# Patient Record
Sex: FEMALE | Race: WHITE | Employment: PART TIME | URBAN - METROPOLITAN AREA
[De-identification: names, ages, dates, MRNs, and addresses within clinical notes are randomized per-mention and may not be internally consistent; named-entity substitution may affect disease eponyms.]

---

## 2018-07-16 ENCOUNTER — APPOINTMENT (OUTPATIENT)
Dept: CT IMAGING | Age: 63
DRG: 312 | End: 2018-07-16
Attending: EMERGENCY MEDICINE
Payer: COMMERCIAL

## 2018-07-16 ENCOUNTER — HOSPITAL ENCOUNTER (INPATIENT)
Age: 63
LOS: 1 days | Discharge: HOME | DRG: 312 | End: 2018-07-17
Attending: EMERGENCY MEDICINE | Admitting: INTERNAL MEDICINE
Payer: COMMERCIAL

## 2018-07-16 DIAGNOSIS — R55 SYNCOPE AND COLLAPSE: Primary | ICD-10-CM

## 2018-07-16 DIAGNOSIS — S09.90XA INJURY OF HEAD, INITIAL ENCOUNTER: ICD-10-CM

## 2018-07-16 LAB
ALBUMIN SERPL-MCNC: 4.2 G/DL (ref 3.5–5)
ALBUMIN/GLOB SERPL: 1.3 {RATIO} (ref 1.1–2.2)
ALP SERPL-CCNC: 81 U/L (ref 45–117)
ALT SERPL-CCNC: 21 U/L (ref 12–78)
ANION GAP SERPL CALC-SCNC: 8 MMOL/L (ref 5–15)
AST SERPL-CCNC: 29 U/L (ref 15–37)
ATRIAL RATE: 80 BPM
BASOPHILS # BLD: 0.1 K/UL (ref 0–0.1)
BASOPHILS NFR BLD: 1 % (ref 0–1)
BILIRUB SERPL-MCNC: 0.9 MG/DL (ref 0.2–1)
BUN SERPL-MCNC: 13 MG/DL (ref 6–20)
BUN/CREAT SERPL: 10 (ref 12–20)
CALCIUM SERPL-MCNC: 9.2 MG/DL (ref 8.5–10.1)
CALCULATED P AXIS, ECG09: 67 DEGREES
CALCULATED R AXIS, ECG10: -15 DEGREES
CALCULATED T AXIS, ECG11: 21 DEGREES
CHLORIDE SERPL-SCNC: 104 MMOL/L (ref 97–108)
CK MB CFR SERPL CALC: 1.5 % (ref 0–2.5)
CK MB SERPL-MCNC: 3.4 NG/ML (ref 5–25)
CK SERPL-CCNC: 220 U/L (ref 26–192)
CO2 SERPL-SCNC: 29 MMOL/L (ref 21–32)
CREAT SERPL-MCNC: 1.27 MG/DL (ref 0.55–1.02)
DIAGNOSIS, 93000: NORMAL
DIFFERENTIAL METHOD BLD: ABNORMAL
EOSINOPHIL # BLD: 0.9 K/UL (ref 0–0.4)
EOSINOPHIL NFR BLD: 9 % (ref 0–7)
ERYTHROCYTE [DISTWIDTH] IN BLOOD BY AUTOMATED COUNT: 12.7 % (ref 11.5–14.5)
GLOBULIN SER CALC-MCNC: 3.3 G/DL (ref 2–4)
GLUCOSE SERPL-MCNC: 94 MG/DL (ref 65–100)
HCT VFR BLD AUTO: 48.6 % (ref 35–47)
HGB BLD-MCNC: 15.7 G/DL (ref 11.5–16)
IMM GRANULOCYTES # BLD: 0.1 K/UL (ref 0–0.04)
IMM GRANULOCYTES NFR BLD AUTO: 1 % (ref 0–0.5)
LYMPHOCYTES # BLD: 1.3 K/UL (ref 0.8–3.5)
LYMPHOCYTES NFR BLD: 13 % (ref 12–49)
MCH RBC QN AUTO: 31.8 PG (ref 26–34)
MCHC RBC AUTO-ENTMCNC: 32.3 G/DL (ref 30–36.5)
MCV RBC AUTO: 98.4 FL (ref 80–99)
MONOCYTES # BLD: 0.9 K/UL (ref 0–1)
MONOCYTES NFR BLD: 9 % (ref 5–13)
NEUTS SEG # BLD: 6.6 K/UL (ref 1.8–8)
NEUTS SEG NFR BLD: 68 % (ref 32–75)
NRBC # BLD: 0 K/UL (ref 0–0.01)
NRBC BLD-RTO: 0 PER 100 WBC
P-R INTERVAL, ECG05: 136 MS
PLATELET # BLD AUTO: 273 K/UL (ref 150–400)
PMV BLD AUTO: 9.8 FL (ref 8.9–12.9)
POTASSIUM SERPL-SCNC: 3.4 MMOL/L (ref 3.5–5.1)
PROT SERPL-MCNC: 7.5 G/DL (ref 6.4–8.2)
Q-T INTERVAL, ECG07: 384 MS
QRS DURATION, ECG06: 76 MS
QTC CALCULATION (BEZET), ECG08: 442 MS
RBC # BLD AUTO: 4.94 M/UL (ref 3.8–5.2)
SODIUM SERPL-SCNC: 141 MMOL/L (ref 136–145)
TROPONIN I SERPL-MCNC: <0.05 NG/ML
TROPONIN I SERPL-MCNC: <0.05 NG/ML
VENTRICULAR RATE, ECG03: 80 BPM
WBC # BLD AUTO: 9.8 K/UL (ref 3.6–11)

## 2018-07-16 PROCEDURE — 99285 EMERGENCY DEPT VISIT HI MDM: CPT

## 2018-07-16 PROCEDURE — 70450 CT HEAD/BRAIN W/O DYE: CPT

## 2018-07-16 PROCEDURE — 65660000000 HC RM CCU STEPDOWN

## 2018-07-16 PROCEDURE — 80053 COMPREHEN METABOLIC PANEL: CPT | Performed by: EMERGENCY MEDICINE

## 2018-07-16 PROCEDURE — 70486 CT MAXILLOFACIAL W/O DYE: CPT

## 2018-07-16 PROCEDURE — 36415 COLL VENOUS BLD VENIPUNCTURE: CPT | Performed by: INTERNAL MEDICINE

## 2018-07-16 PROCEDURE — 84484 ASSAY OF TROPONIN QUANT: CPT | Performed by: EMERGENCY MEDICINE

## 2018-07-16 PROCEDURE — 85025 COMPLETE CBC W/AUTO DIFF WBC: CPT | Performed by: EMERGENCY MEDICINE

## 2018-07-16 PROCEDURE — 74011250637 HC RX REV CODE- 250/637: Performed by: INTERNAL MEDICINE

## 2018-07-16 PROCEDURE — 74011250636 HC RX REV CODE- 250/636: Performed by: INTERNAL MEDICINE

## 2018-07-16 PROCEDURE — 82550 ASSAY OF CK (CPK): CPT | Performed by: EMERGENCY MEDICINE

## 2018-07-16 PROCEDURE — 93005 ELECTROCARDIOGRAM TRACING: CPT

## 2018-07-16 PROCEDURE — 82553 CREATINE MB FRACTION: CPT | Performed by: EMERGENCY MEDICINE

## 2018-07-16 RX ORDER — SODIUM CHLORIDE 9 MG/ML
75 INJECTION, SOLUTION INTRAVENOUS CONTINUOUS
Status: DISCONTINUED | OUTPATIENT
Start: 2018-07-16 | End: 2018-07-17 | Stop reason: HOSPADM

## 2018-07-16 RX ORDER — TRAMADOL HYDROCHLORIDE 50 MG/1
50 TABLET ORAL
COMMUNITY
End: 2018-07-31 | Stop reason: ALTCHOICE

## 2018-07-16 RX ORDER — GUAIFENESIN 100 MG/5ML
81 LIQUID (ML) ORAL DAILY
Status: DISCONTINUED | OUTPATIENT
Start: 2018-07-17 | End: 2018-07-17 | Stop reason: HOSPADM

## 2018-07-16 RX ORDER — FAMOTIDINE 20 MG/1
20 TABLET, FILM COATED ORAL
Status: DISCONTINUED | OUTPATIENT
Start: 2018-07-16 | End: 2018-07-17 | Stop reason: HOSPADM

## 2018-07-16 RX ORDER — ACETAMINOPHEN 325 MG/1
650 TABLET ORAL
Status: DISCONTINUED | OUTPATIENT
Start: 2018-07-16 | End: 2018-07-17 | Stop reason: HOSPADM

## 2018-07-16 RX ORDER — SODIUM CHLORIDE 0.9 % (FLUSH) 0.9 %
5-10 SYRINGE (ML) INJECTION EVERY 8 HOURS
Status: DISCONTINUED | OUTPATIENT
Start: 2018-07-16 | End: 2018-07-17 | Stop reason: HOSPADM

## 2018-07-16 RX ORDER — ACETAMINOPHEN 325 MG/1
325 TABLET ORAL
COMMUNITY

## 2018-07-16 RX ORDER — ALBUTEROL SULFATE 0.83 MG/ML
SOLUTION RESPIRATORY (INHALATION) ONCE
COMMUNITY
End: 2018-07-16

## 2018-07-16 RX ORDER — SODIUM CHLORIDE 0.9 % (FLUSH) 0.9 %
5-10 SYRINGE (ML) INJECTION AS NEEDED
Status: DISCONTINUED | OUTPATIENT
Start: 2018-07-16 | End: 2018-07-17 | Stop reason: HOSPADM

## 2018-07-16 RX ORDER — TRAZODONE HYDROCHLORIDE 50 MG/1
50 TABLET ORAL
Status: DISCONTINUED | OUTPATIENT
Start: 2018-07-16 | End: 2018-07-17 | Stop reason: HOSPADM

## 2018-07-16 RX ORDER — ALBUTEROL SULFATE 90 UG/1
2 AEROSOL, METERED RESPIRATORY (INHALATION)
COMMUNITY

## 2018-07-16 RX ORDER — MONTELUKAST SODIUM 10 MG/1
10 TABLET ORAL
Status: DISCONTINUED | OUTPATIENT
Start: 2018-07-16 | End: 2018-07-17 | Stop reason: HOSPADM

## 2018-07-16 RX ORDER — ALBUTEROL SULFATE 90 UG/1
2 AEROSOL, METERED RESPIRATORY (INHALATION)
Status: DISCONTINUED | OUTPATIENT
Start: 2018-07-16 | End: 2018-07-17 | Stop reason: HOSPADM

## 2018-07-16 RX ORDER — ENOXAPARIN SODIUM 100 MG/ML
40 INJECTION SUBCUTANEOUS EVERY 24 HOURS
Status: DISCONTINUED | OUTPATIENT
Start: 2018-07-16 | End: 2018-07-17 | Stop reason: HOSPADM

## 2018-07-16 RX ORDER — ACETAMINOPHEN 325 MG/1
325 TABLET ORAL
Status: COMPLETED | OUTPATIENT
Start: 2018-07-16 | End: 2018-07-16

## 2018-07-16 RX ADMIN — ENOXAPARIN SODIUM 40 MG: 40 INJECTION SUBCUTANEOUS at 18:36

## 2018-07-16 RX ADMIN — ACETAMINOPHEN 325 MG: 325 TABLET ORAL at 18:36

## 2018-07-16 RX ADMIN — ALBUTEROL SULFATE 2 PUFF: 90 AEROSOL, METERED RESPIRATORY (INHALATION) at 23:31

## 2018-07-16 RX ADMIN — SODIUM CHLORIDE 75 ML/HR: 900 INJECTION, SOLUTION INTRAVENOUS at 18:36

## 2018-07-16 RX ADMIN — Medication 10 ML: at 20:36

## 2018-07-16 RX ADMIN — BACITRACIN ZINC, NEOMYCIN SULFATE, POLYMYXIN B SULFATE 1 PACKET: 3.5; 5000; 4 OINTMENT TOPICAL at 18:36

## 2018-07-16 NOTE — PROGRESS NOTES
Pharmacy Clarification of Prior to Admission Medication Regimen     The patient was interviewed regarding clarification of the prior to admission medication regimen. Patient's  was present in room and obtained permission from patient to discuss drug regimen with visitor(s) present. Patient was questioned regarding use of any other inhalers, topical products, over the counter medications, herbal medications, vitamin products or ophthalmic/nasal/otic medication use. Information Obtained From: Rx Query and patient    Pertinent Pharmacy Findings:   Updated patients preferred outpatient pharmacy to: [facility] [address] [address]   montelukast (SINGULAIR) 10 mg tablet: Patient stated she has this agent at home and takes it \"as needed. \"    PTA medication list was corrected to the following:     Prior to Admission Medications   Prescriptions Last Dose Informant Patient Reported? Taking?   acetaminophen (TYLENOL) 325 mg tablet 7/15/2018 at Unknown time Self Yes Yes   Sig: Take 325 mg by mouth every four (4) hours as needed for Pain. albuterol (PROAIR HFA) 90 mcg/actuation inhaler 7/15/2018 at Unknown time Self Yes Yes   Sig: Take 2 Puffs by inhalation every six (6) hours as needed for Wheezing. doxylamine succinate (SLEEP AID, DOXYLAMINE,) 25 mg tablet 7/14/2018 at Unknown time Self Yes Yes   Sig: Take 50 mg by mouth nightly as needed for Sleep.   famotidine (PEPCID PO) 7/14/2018 at Unknown time Self Yes Yes   Sig: Take 1 Tab by mouth daily as needed (\"GERD\"). montelukast (SINGULAIR) 10 mg tablet Not Taking at Unknown time Self Yes No   Sig: Take 10 mg by mouth daily as needed (\"Allergies\"). traMADol (ULTRAM) 50 mg tablet 7/15/2018 at Unknown time Self Yes Yes   Sig: Take 50 mg by mouth every six (6) hours as needed for Pain.       Facility-Administered Medications: None          Thank you,  Reginald Chambers CPhT  Medication History Pharmacy Technician

## 2018-07-16 NOTE — IP AVS SNAPSHOT
[address] [facility] 
[phone] Patient: Leanne Dai MRN: [id number] :1955 A check mavis indicates which time of day the medication should be taken. My Medications CONTINUE taking these medications Instructions Each Dose to Equal  
 Morning Noon Evening Bedtime PEPCID PO Your last dose was: Your next dose is: Take 1 Tab by mouth daily as needed (\"GERD\"). 1 Tab PROAIR HFA 90 mcg/actuation inhaler Generic drug:  albuterol Your last dose was: Your next dose is: Take 2 Puffs by inhalation every six (6) hours as needed for Wheezing. 2 Puff SINGULAIR 10 mg tablet Generic drug:  montelukast  
   
Your last dose was: Your next dose is: Take 10 mg by mouth daily as needed (\"Allergies\"). 10 mg Sleep Aid (Doxylamine) 25 mg tablet Generic drug:  doxylamine succinate Your last dose was: Your next dose is: Take 50 mg by mouth nightly as needed for Sleep. 50 mg  
    
   
   
   
  
 traMADol 50 mg tablet Commonly known as:  ULTRAM  
   
Your last dose was: Your next dose is: Take 50 mg by mouth every six (6) hours as needed for Pain. 50 mg  
    
   
   
   
  
 TYLENOL 325 mg tablet Generic drug:  acetaminophen Your last dose was: Your next dose is: Take 325 mg by mouth every four (4) hours as needed for Pain.   
 325 mg

## 2018-07-16 NOTE — ED NOTES
TRANSFER - OUT REPORT:    Verbal report given to Pearl Stanley RN(name) on Darvin Lau  being transferred to neuro room 3123(unit) for routine progression of care       Report consisted of patients Situation, Background, Assessment and   Recommendations(SBAR). Information from the following report(s) SBAR, Kardex, ED Summary, Procedure Summary, Intake/Output, MAR and Recent Results was reviewed with the receiving nurse. Lines:   Peripheral IV 07/16/18 Right Antecubital (Active)   Site Assessment Clean, dry, & intact 7/16/2018  6:16 PM   Phlebitis Assessment 0 7/16/2018  6:16 PM   Infiltration Assessment 0 7/16/2018  6:16 PM   Dressing Status Clean, dry, & intact 7/16/2018  6:16 PM   Dressing Type Transparent 7/16/2018  6:16 PM   Hub Color/Line Status Pink;Capped;Flushed;Patent 7/16/2018  6:16 PM        Opportunity for questions and clarification was provided.       Patient transported with:   [employer]

## 2018-07-16 NOTE — CONSULTS
[address] [place] [address] [facility]     Date of  Admission: 7/16/2018 12:46 PM     Admission type:Emergency    Consult for: syncope  Consult by:ED physician      Subjective:     Madeline Garces is a 57 y.o. female with PMH asthma who presented to the ED after a syncopal episode last night. Per ED provider note and patient report, Ms. Lawyer Tate spent yesterday at the pool, and when she got home she was walking through the house when she passed out. Her  was present. The syncopal episode did not have preceding symptoms. Ms. Lawyer Tate felt normal when she awoke on the floor, and her  states that she was only \"out for a second\". She presented to the ED because of the swelling on her face from the fall, not because of any symptoms out of her ordinary. She denies chest pain, SOB, palpitations, dizziness, numbness/tingling, leg swelling,  No new medications, travel, or illnesses. She does not smoke and only drinks socially. ED provider note states she had two beers yesterday. Ms. Lawyer Tate does not follow with a cardiologist and has no prior cardiac testing.        Cardiac risk factors: post-menopausal.      Patient Active Problem List    Diagnosis Date Noted    Syncope 07/16/2018      Michael Zuniga MD  Past Medical History:   Diagnosis Date    Asthma     Skin cancer     Sun-damaged skin     Tanning bed exposure       Social History     Social History    Marital status:      Spouse name: N/A    Number of children: N/A    Years of education: N/A     Social History Main Topics    Smoking status: Never Smoker    Smokeless tobacco: Never Used    Alcohol use Yes      Comment: socially    Drug use: No    Sexual activity: Yes     Birth control/ protection: Surgical     Other Topics Concern    None     Social History Narrative     Allergies   Allergen Reactions    Augmentin [Amoxicillin-Pot Clavulanate] Hives      History reviewed. No pertinent family history. No current facility-administered medications for this encounter. Current Outpatient Prescriptions   Medication Sig    albuterol (PROAIR HFA) 90 mcg/actuation inhaler Take 2 Puffs by inhalation every six (6) hours as needed for Wheezing.  doxylamine succinate (SLEEP AID, DOXYLAMINE,) 25 mg tablet Take 50 mg by mouth nightly as needed for Sleep.  traMADol (ULTRAM) 50 mg tablet Take 50 mg by mouth every six (6) hours as needed for Pain.  famotidine (PEPCID PO) Take 1 Tab by mouth daily as needed (\"GERD\").  acetaminophen (TYLENOL) 325 mg tablet Take 325 mg by mouth every four (4) hours as needed for Pain.  montelukast (SINGULAIR) 10 mg tablet Take 10 mg by mouth daily as needed (\"Allergies\"). Review of Symptoms:   11 systems reviewed, negative other than as stated in the HPI        Objective:      Visit Vitals    BP (!) 112/92    Pulse 83    Temp 98.1 °F (36.7 °C)    Resp 14    Ht 5' 5\" (1.651 m)    Wt 60.6 kg (133 lb 9.6 oz)    SpO2 93%    BMI 22.23 kg/m2       Physical:   General: pleasant  female resting on stretcher in NAD. R periorbital swelling and ecchymosis.    Heart: RRR, no m/S3/JVD, no carotid bruits   Lungs: clear   Abdomen: Soft, +BS, NTND   Extremities: LE felisa +DP/PT, no edema   Neurologic: Grossly normal    Data Review:   Recent Labs      07/16/18   1236   WBC  9.8   HGB  15.7   HCT  48.6*   PLT  273     Recent Labs      07/16/18   1236   NA  141   K  3.4*   CL  104   CO2  29   GLU  94   BUN  13   CREA  1.27*   CA  9.2   ALB  4.2   TBILI  0.9   SGOT  29   ALT  21       Recent Labs      07/16/18   1236   TROIQ  <0.05   CKMB  3.4       No intake or output data in the 24 hours ending 07/16/18 1609     Cardiographics    Telemetry: NSR  ECG: NSR  Echocardiogram: ordered  CT head: no acute process  CT maxillofacial: no fracture        Assessment:       Active Problems:    Syncope (7/16/2018)         Plan: Kierra Byrd is a 57 y.o. female who presented to the hospital today after a syncopal episode last night. She abruptly passed out while walking in her house. She has never had syncope before. She denies symptoms other than swelling to her face. K 3.4; creat 1.27; ;  Troponin negative. · Unclear cause of syncope. · Orthostatics ordered in ED  · Obtain ECHO and stress test  · Continuous cardiac monitoring  · If no identifiable cause of syncope, can consider out-patient event monitor to further assess for arrhythmias. Thank you for consulting [facility]    Jeaneth Mcintyre NP  DNP, RN, AGACNP-BC      Pt seen and examined in details. Agree with NP A&P. D/w pt and .      Kari Avila MD

## 2018-07-16 NOTE — ED PROVIDER NOTES
EMERGENCY DEPARTMENT HISTORY AND PHYSICAL EXAM 
 
 
Date: 7/16/2018 Patient Name: Mikie Blizzard History of Presenting Illness Chief Complaint Patient presents with  Syncope Last night after drinking a few beers and at the pool injurying face History Provided By: Patient and Patient's  HPI: Mikie Blizzard, 57 y.o. female with PMHx significant for asthma, presents ambulatory to the ED with cc of syncopal episode yesterday evening. She reports facial pain and swelling because of the GLF due to syncopal episode. Pt states she was at the pool yesterday from 11:30 AM to 4 PM during which time she admits to not eating or drinking much. She reports that after leaving the pool, she stopped to get a sandwich and had 2 beers while waiting. Pt states she ate part of the sandwich during her 30 minute drive home. She reports that she returned home, walked through the door, and passed out. Per pt's , after the episode pt seemed like \"she was coughing like she needed her inhaler. \" He states pt was not shaking and that she came out of it quickly. Pt states she applied ice to her face and head after the fall with no relief of swelling. She reports that she called her PCP today who recommended she come to the ED. Pt states she has never passed out before. She denies any sxs prior to her syncopal episode. Pt denies any recent illnesses. She denies any new medication. Pt denies any associated blood in her stool or any urinary sxs. She denies PMHx blood clots, stroke, aneurysm, or seizure. Pt notes FMHx of her mother passing out when she vomits. There are no other complaints, changes, or physical findings at this time. PCP: Deepthi Mcwilliams MD 
 
Current Facility-Administered Medications Medication Dose Route Frequency Provider Last Rate Last Dose  sodium chloride (NS) flush 5-10 mL  5-10 mL IntraVENous Q8H Sina Estrada MD      
 sodium chloride (NS) flush 5-10 mL  5-10 mL IntraVENous PRN Susan Abdul MD      
 0.9% sodium chloride infusion  75 mL/hr IntraVENous CONTINUOUS Susan Abdul MD      
 enoxaparin (LOVENOX) injection 40 mg  40 mg SubCUTAneous Q24H Susan Abdul MD      
 albuterol (PROVENTIL HFA, VENTOLIN HFA, PROAIR HFA) inhaler 2 Puff  2 Puff Inhalation Q4H PRN Susan Abdul MD      
 famotidine (PEPCID) tablet 20 mg  20 mg Oral DAILY PRN Susan Abdul MD      
 montelukast (SINGULAIR) tablet 10 mg  10 mg Oral DAILY PRN Susan Abdul MD      
 
Current Outpatient Prescriptions Medication Sig Dispense Refill  albuterol (PROAIR HFA) 90 mcg/actuation inhaler Take 2 Puffs by inhalation every six (6) hours as needed for Wheezing.  doxylamine succinate (SLEEP AID, DOXYLAMINE,) 25 mg tablet Take 50 mg by mouth nightly as needed for Sleep.  traMADol (ULTRAM) 50 mg tablet Take 50 mg by mouth every six (6) hours as needed for Pain.  famotidine (PEPCID PO) Take 1 Tab by mouth daily as needed (\"GERD\").  acetaminophen (TYLENOL) 325 mg tablet Take 325 mg by mouth every four (4) hours as needed for Pain.  montelukast (SINGULAIR) 10 mg tablet Take 10 mg by mouth daily as needed (\"Allergies\"). Past History Past Medical History: 
Past Medical History:  
Diagnosis Date  Asthma   
 Skin cancer  Sun-damaged skin  Tanning bed exposure Past Surgical History: 
Past Surgical History:  
Procedure Laterality Date  HX HYSTERECTOMY Family History: 
History reviewed. No pertinent family history. Social History: 
Social History Substance Use Topics  Smoking status: Never Smoker  Smokeless tobacco: Never Used  Alcohol use Yes Comment: socially Allergies: Allergies Allergen Reactions  Augmentin [Amoxicillin-Pot Clavulanate] Hives Review of Systems Review of Systems Constitutional: Negative. Negative for chills and fever. HENT: Positive for facial swelling (with pain).  Negative for congestion and rhinorrhea. Respiratory: Negative. Negative for cough, chest tightness and wheezing. Cardiovascular: Negative. Negative for chest pain and palpitations. Gastrointestinal: Negative. Negative for abdominal pain, blood in stool, constipation, nausea and vomiting. Endocrine: Negative. Genitourinary: Negative. Negative for decreased urine volume, difficulty urinating, dysuria, flank pain, hematuria and pelvic pain. Musculoskeletal: Negative. Negative for back pain and neck pain. Skin: Negative. Negative for color change, pallor and rash. Neurological: Positive for syncope. Negative for dizziness, seizures, weakness, numbness and headaches. Hematological: Negative. Negative for adenopathy. Psychiatric/Behavioral: Negative. All other systems reviewed and are negative. Physical Exam  
Physical Exam  
Constitutional: She is oriented to person, place, and time. She appears well-developed and well-nourished. No distress. HENT:  
Head: Normocephalic. Head is with contusion. Head is without raccoon's eyes and without Anguiano's sign. Right Ear: No hemotympanum. Left Ear: No hemotympanum. Nose: No nasal septal hematoma. No epistaxis. Mouth/Throat: No oropharyngeal exudate. Bruising right face, no bone step off, EOMI, no entrapment Eyes: Conjunctivae are normal. Pupils are equal, round, and reactive to light. Right eye exhibits no discharge. Left eye exhibits no discharge. No scleral icterus. Neck: Normal range of motion. Neck supple. No JVD present. No rigidity. Normal range of motion present. Cardiovascular: Normal rate, regular rhythm, normal heart sounds, intact distal pulses and normal pulses. PMI is not displaced. Exam reveals no gallop and no friction rub. No murmur heard. Pulmonary/Chest: Effort normal and breath sounds normal. No stridor. No respiratory distress. She has no wheezes. She has no rales. She exhibits no tenderness.   
Abdominal: Soft. Bowel sounds are normal. She exhibits no distension and no mass. There is no tenderness. There is no rebound and no guarding. Neurological: She is alert and oriented to person, place, and time. She displays normal reflexes. No cranial nerve deficit. She exhibits normal muscle tone. Coordination normal.  
Skin: Skin is warm. No rash noted. She is not diaphoretic. No pallor. Vitals reviewed. Diagnostic Study Results Labs - Recent Results (from the past 12 hour(s)) EKG, 12 LEAD, INITIAL Collection Time: 07/16/18 12:24 PM  
Result Value Ref Range Ventricular Rate 80 BPM  
 Atrial Rate 80 BPM  
 P-R Interval 136 ms QRS Duration 76 ms  
 Q-T Interval 384 ms QTC Calculation (Bezet) 442 ms Calculated P Axis 67 degrees Calculated R Axis -15 degrees Calculated T Axis 21 degrees Diagnosis Normal sinus rhythm Nonspecific ST abnormality No previous ECGs available METABOLIC PANEL, COMPREHENSIVE Collection Time: 07/16/18 12:36 PM  
Result Value Ref Range Sodium 141 136 - 145 mmol/L Potassium 3.4 (L) 3.5 - 5.1 mmol/L Chloride 104 97 - 108 mmol/L  
 CO2 29 21 - 32 mmol/L Anion gap 8 5 - 15 mmol/L Glucose 94 65 - 100 mg/dL BUN 13 6 - 20 MG/DL Creatinine 1.27 (H) 0.55 - 1.02 MG/DL  
 BUN/Creatinine ratio 10 (L) 12 - 20 GFR est AA 51 (L) >60 ml/min/1.73m2 GFR est non-AA 42 (L) >60 ml/min/1.73m2 Calcium 9.2 8.5 - 10.1 MG/DL Bilirubin, total 0.9 0.2 - 1.0 MG/DL  
 ALT (SGPT) 21 12 - 78 U/L  
 AST (SGOT) 29 15 - 37 U/L Alk. phosphatase 81 45 - 117 U/L Protein, total 7.5 6.4 - 8.2 g/dL Albumin 4.2 3.5 - 5.0 g/dL Globulin 3.3 2.0 - 4.0 g/dL A-G Ratio 1.3 1.1 - 2.2    
CBC WITH AUTOMATED DIFF Collection Time: 07/16/18 12:36 PM  
Result Value Ref Range WBC 9.8 3.6 - 11.0 K/uL  
 RBC 4.94 3.80 - 5.20 M/uL  
 HGB 15.7 11.5 - 16.0 g/dL HCT 48.6 (H) 35.0 - 47.0 %  MCV 98.4 80.0 - 99.0 FL  
 MCH 31.8 26.0 - 34.0 PG  
 MCHC 32.3 30.0 - 36.5 g/dL  
 RDW 12.7 11.5 - 14.5 % PLATELET 150 281 - 958 K/uL MPV 9.8 8.9 - 12.9 FL  
 NRBC 0.0 0  WBC ABSOLUTE NRBC 0.00 0.00 - 0.01 K/uL NEUTROPHILS 68 32 - 75 % LYMPHOCYTES 13 12 - 49 % MONOCYTES 9 5 - 13 % EOSINOPHILS 9 (H) 0 - 7 % BASOPHILS 1 0 - 1 % IMMATURE GRANULOCYTES 1 (H) 0.0 - 0.5 % ABS. NEUTROPHILS 6.6 1.8 - 8.0 K/UL  
 ABS. LYMPHOCYTES 1.3 0.8 - 3.5 K/UL  
 ABS. MONOCYTES 0.9 0.0 - 1.0 K/UL  
 ABS. EOSINOPHILS 0.9 (H) 0.0 - 0.4 K/UL  
 ABS. BASOPHILS 0.1 0.0 - 0.1 K/UL  
 ABS. IMM. GRANS. 0.1 (H) 0.00 - 0.04 K/UL  
 DF AUTOMATED    
TROPONIN I Collection Time: 07/16/18 12:36 PM  
Result Value Ref Range Troponin-I, Qt. <0.05 <0.05 ng/mL CK W/ REFLX CKMB Collection Time: 07/16/18 12:36 PM  
Result Value Ref Range  (H) 26 - 192 U/L  
CK-MB,QUANT. Collection Time: 07/16/18 12:36 PM  
Result Value Ref Range CK - MB 3.4 <3.6 NG/ML  
 CK-MB Index 1.5 0 - 2.5 Radiologic Studies -  
CT Results  (Last 48 hours) 07/16/18 1427  CT HEAD WO CONT Final result Impression:  IMPRESSION: No acute intracranial abnormality. Narrative:  EXAM:  CT HEAD WITHOUT CONTRAST INDICATION: Fall. COMPARISON: None. CONTRAST: None. TECHNIQUE: Unenhanced CT of the head was performed using 5 mm images. Brain and  
bone windows were generated. Sagittal and coronal reformations were generated. CT dose reduction was achieved through use of a standardized protocol tailored  
for this examination and automatic exposure control for dose modulation. FINDINGS:  
The ventricles and sulci are normal in size, shape and configuration and  
midline. There is no significant white matter disease. There is no  
intracranial hemorrhage. There is no extra-axial collection, mass, mass effect  
or midline shift. The basilar cisterns are open. No acute infarct is  
identified. The bone windows demonstrate no abnormalities. There is  
mucoperiosteal thickening in the paranasal sinuses. 07/16/18 1427  CT MAXILLOFACIAL WO CONT Final result Impression:  IMPRESSION: No facial fracture. Narrative:  EXAM:  CT MAXILLOFACIAL WO CONT INDICATION: Fall. COMPARISON:  None. CONTRAST: None. TECHNIQUE:  Multislice helical CT of the facial bones was performed in the axial  
plane without intravenous contrast administration. Coronal and sagittal  
reformations were generated. CT dose reduction was achieved through use of a  
standardized protocol tailored for this examination and automatic exposure  
control for dose modulation. FINDINGS:  
There is no facial fracture or other osseous abnormality. There is mucoperiosteal thickening throughout the maxillary and ethmoid sinuses. The globes, optic nerves and extraocular muscles are normal.  
No abnormalities are identified within the visualized portions of the brain or  
nasopharynx. Medical Decision Making I am the first provider for this patient. I reviewed the vital signs, available nursing notes, past medical history, past surgical history, family history and social history. Vital Signs-Reviewed the patient's vital signs. Patient Vitals for the past 12 hrs: 
 Temp Pulse Resp BP SpO2  
07/16/18 1745 - 81 - (!) 136/92 93 % 07/16/18 1700 - 79 - (!) 128/91 90 % 07/16/18 1615 - 98 - (!) 134/91 92 % 07/16/18 1606 - 91 - (!) 122/91 92 % 07/16/18 1605 - 85 - (!) 134/96 93 % 07/16/18 1603 - 79 - 137/83 94 % 07/16/18 1530 - - - (!) 112/92 93 % 07/16/18 1518 - 83 14 119/83 94 % 07/16/18 1516 - - - 119/83 -  
07/16/18 1216 98.1 °F (36.7 °C) 94 18 118/87 95 % EKG interpretation: (Preliminary) 12:24 Rhythm: normal sinus rhythm; and regular . Rate (approx.): 80; Axis: normal; SD interval: normal; QRS interval: normal ; ST/T wave: nonspecific ST abnormality.  
Written by BRADEN Humphries, as dictated by Gabe Frias MD. Records Reviewed: Old Medical Records Provider Notes (Medical Decision Making): DDx: vasovagal syncope, arrhythmia, seizure, ICH, facial fracture, cervical injury, anemia, electrolyte abnormality Impression/plan: healthy pt experienced unprovoked syncopal event not preceded by sxs. Of concern, pt fell and hit her face without trying to protect herself. Work up unremarkable in ER but due to pts age, will admit for syncope evaluation. ED Course:  
Initial assessment performed. The patients presenting problems have been discussed, and they are in agreement with the care plan formulated and outlined with them. I have encouraged them to ask questions as they arise throughout their visit. Consult Note: 
3:25 PM 
Gabe Frias MD spoke with Vasiliy Gao MD 
Specialty: Cardiology Discussed pt's hx, disposition, and available diagnostic and imaging results. Dr. Audra Paniagua recommends admission for syncope work up. Consult Note: 
3:32 PM 
Gabe Frias MD spoke with Isa Awad MD 
Specialty: Hospitalist 
Discussed pt's hx, disposition, and available diagnostic and imaging results. Dr. Raúl Tucker will admit pt. Disposition: 
Admit Note: 
3:32 PM 
Pt is being admitted by Dr. Raúl Tucker. The results of their tests and reason(s) for their admission have been discussed with pt and/or available family. They convey agreement and understanding for the need to be admitted and for admission diagnosis. Diagnosis Clinical Impression: 1. Syncope and collapse 2. Injury of head, initial encounter Attestations: This note is prepared by Teresa Choi, acting as a Scribe for MD Gabe Ruth MD: The scribe's documentation has been prepared under my direction and personally reviewed by me in its entirety.  I confirm that the notes above accurately reflects all work, treatment, procedures, and medical decision making performed by me.

## 2018-07-16 NOTE — ED NOTES
Pt. Has bruising to left forehead and bruising (green/red/purple) to right side of forehead and around eye to bridge of nose.

## 2018-07-16 NOTE — IP AVS SNAPSHOT
[address] [facility] 
[phone] Patient: Ana Will MRN: [id number] :1955 About your hospitalization You were admitted on:  2018 You last received care in the:  [facility] 3 NEUROSCIENCE TELEMETRY You were discharged on:  2018 Why you were hospitalized Your primary diagnosis was:  Not on File Your diagnoses also included:  Syncope Follow-up Information Follow up With Details Comments Contact Info Kenny Garcai MD Go on 2018 Please follow up on 2018 at 9:00 [address] [place] [id number] 
[phone] Discharge Orders None A check mavis indicates which time of day the medication should be taken. My Medications CONTINUE taking these medications Instructions Each Dose to Equal  
 Morning Noon Evening Bedtime PEPCID PO Your last dose was: Your next dose is: Take 1 Tab by mouth daily as needed (\"GERD\"). 1 Tab PROAIR HFA 90 mcg/actuation inhaler Generic drug:  albuterol Your last dose was: Your next dose is: Take 2 Puffs by inhalation every six (6) hours as needed for Wheezing. 2 Puff SINGULAIR 10 mg tablet Generic drug:  montelukast  
   
Your last dose was: Your next dose is: Take 10 mg by mouth daily as needed (\"Allergies\"). 10 mg Sleep Aid (Doxylamine) 25 mg tablet Generic drug:  doxylamine succinate Your last dose was: Your next dose is: Take 50 mg by mouth nightly as needed for Sleep. 50 mg  
    
   
   
   
  
 traMADol 50 mg tablet Commonly known as:  ULTRAM  
   
Your last dose was: Your next dose is: Take 50 mg by mouth every six (6) hours as needed for Pain.   
 50 mg  
    
   
   
   
  
 TYLENOL 325 mg tablet Generic drug:  acetaminophen Your last dose was: Your next dose is: Take 325 mg by mouth every four (4) hours as needed for Pain. 325 mg Opioid Education Prescription Opioids: What You Need to Know: 
 
Prescription opioids can be used to help relieve moderate-to-severe pain and are often prescribed following a surgery or injury, or for certain health conditions. These medications can be an important part of treatment but also come with serious risks. Opioids are strong pain medicines. Examples include hydrocodone, oxycodone, fentanyl, and morphine. Heroin is an example of an illegal opioid. It is important to work with your health care provider to make sure you are getting the safest, most effective care. WHAT ARE THE RISKS AND SIDE EFFECTS OF OPIOID USE? Prescription opioids carry serious risks of addiction and overdose, especially with prolonged use. An opioid overdose, often marked by slow breathing, can cause sudden death. The use of prescription opioids can have a number of side effects as well, even when taken as directed. · Tolerance-meaning you might need to take more of a medication for the same pain relief · Physical dependence-meaning you have symptoms of withdrawal when the medication is stopped. Withdrawal symptoms can include nausea, sweating, chills, diarrhea, stomach cramps, and muscle aches. Withdrawal can last up to several weeks, depending on which drug you took and how long you took it. · Increased sensitivity to pain · Constipation · Nausea, vomiting, and dry mouth · Sleepiness and dizziness · Confusion · Depression · Low levels of testosterone that can result in lower sex drive, energy, and strength · Itching and sweating RISKS ARE GREATER WITH:      
· History of drug misuse, substance use disorder, or overdose · Mental health conditions (such as depression or anxiety) · Sleep apnea · Older age (72 years or older) · Pregnancy Avoid alcohol while taking prescription opioids. Also, unless specifically advised by your health care provider, medications to avoid include: · Benzodiazepines (such as Xanax or Valium) · Muscle relaxants (such as Soma or Flexeril) · Hypnotics (such as Ambien or Lunesta) · Other prescription opioids KNOW YOUR OPTIONS Talk to your health care provider about ways to manage your pain that don't involve prescription opioids. Some of these options may actually work better and have fewer risks and side effects. Options may include: 
· Pain relievers such as acetaminophen, ibuprofen, and naproxen · Some medications that are also used for depression or seizures · Physical therapy and exercise · Counseling to help patients learn how to cope better with triggers of pain and stress. · Application of heat or cold compress · Massage therapy · Relaxation techniques Be Informed Make sure you know the name of your medication, how much and how often to take it, and its potential risks & side effects. IF YOU ARE PRESCRIBED OPIOIDS FOR PAIN: 
· Never take opioids in greater amounts or more often than prescribed. Remember the goal is not to be pain-free but to manage your pain at a tolerable level. · Follow up with your primary care provider to: · Work together to create a plan on how to manage your pain. · Talk about ways to help manage your pain that don't involve prescription opioids. · Talk about any and all concerns and side effects. · Help prevent misuse and abuse. · Never sell or share prescription opioids · Help prevent misuse and abuse. · Store prescription opioids in a secure place and out of reach of others (this may include visitors, children, friends, and family).  
· Safely dispose of unused/unwanted prescription opioids: Find your community drug take-back program or your pharmacy mail-back program, or flush them down the toilet, following guidance from the Food and Drug Administration (www.fda.gov/Drugs/ResourcesForYou). · Visit www.cdc.gov/drugoverdose to learn about the risks of opioid abuse and overdose. · If you believe you may be struggling with addiction, tell your health care provider and ask for guidance or call Jamee Mendez at 8-967-796-HELP. Discharge Instructions Fainting: Care Instructions Your Care Instructions When you faint, or pass out, you lose consciousness for a short time. A brief drop in blood flow to the brain often causes it. When you fall or lie down, more blood flows to your brain and you regain consciousness. Emotional stress, pain, or overheating-especially if you have been standing-can make you faint. In these cases, fainting is usually not serious. But fainting can be a sign of a more serious problem. Your doctor may want you to have more tests to rule out other causes. The treatment you need depends on the reason why you fainted. The doctor has checked you carefully, but problems can develop later. If you notice any problems or new symptoms, get medical treatment right away. Follow-up care is a key part of your treatment and safety. Be sure to make and go to all appointments, and call your doctor if you are having problems. It's also a good idea to know your test results and keep a list of the medicines you take. How can you care for yourself at home? · Drink plenty of fluids to prevent dehydration. If you have kidney, heart, or liver disease and have to limit fluids, talk with your doctor before you increase your fluid intake. When should you call for help? Call 911 anytime you think you may need emergency care. For example, call if: 
  · You have symptoms of a heart problem. These may include: ¨ Chest pain or pressure. ¨ Severe trouble breathing. ¨ A fast or irregular heartbeat. ¨ Lightheadedness or sudden weakness. ¨ Coughing up pink, foamy mucus. ¨ Passing out. After you call 911, the  may tell you to chew 1 adult-strength or 2 to 4 low-dose aspirin. Wait for an ambulance. Do not try to drive yourself.  
  · You have symptoms of a stroke. These may include: 
¨ Sudden numbness, tingling, weakness, or loss of movement in your face, arm, or leg, especially on only one side of your body. ¨ Sudden vision changes. ¨ Sudden trouble speaking. ¨ Sudden confusion or trouble understanding simple statements. ¨ Sudden problems with walking or balance. ¨ A sudden, severe headache that is different from past headaches.  
  · You passed out (lost consciousness) again.  
 Watch closely for changes in your health, and be sure to contact your doctor if: 
  · You do not get better as expected. Where can you learn more? Go to http://gordon-ollie.info/. Enter L699 in the search box to learn more about \"Fainting: Care Instructions. \" Current as of: November 20, 2017 Content Version: 11.7 © 5827-0140 [employer]PTV. Care instructions adapted under license by [employer] (which disclaims liability or warranty for this information). If you have questions about a medical condition or this instruction, always ask your healthcare professional. [address] any warranty or liability for your use of this information. Other instructions: 
-Take your medications as prescribed. -Follow up with pcp 
-Seek medical attention if recurrence of symptoms. [employer] Announcement We are excited to announce that we are making your provider's discharge notes available to you in [employer]. You will see these notes when they are completed and signed by the physician that discharged you from your recent hospital stay.   If you have any questions or concerns about any information you see in [employer], please call the Health Information Department where you were seen or reach out to your Primary Care Provider for more information about your plan of care. Introducing [facility]! Dear Leonardo Simon: Thank you for requesting a [employer] account. Our records indicate that you already have an active [employer] account. You can access your account anytime at [email or web address] Did you know that you can access your hospital and ER discharge instructions at any time in [employer]? You can also review all of your test results from your hospital stay or ER visit. Additional Information If you have questions, please visit the Frequently Asked Questions section of the [employer] website at [email or web address] Remember, [employer] is NOT to be used for urgent needs. For medical emergencies, dial 911. Now available from your iPhone and Android! Introducing Luis Alberto Puckett As a [employer] patient, I wanted to make you aware of our electronic visit tool called Luis Alberto Puckett. [employer] 24/7 allows you to connect within minutes with a medical provider 24 hours a day, seven days a week via a mobile device or tablet or logging into a secure website from your computer. You can access Luis Alberto Puckett from anywhere in the [place]. A virtual visit might be right for you when you have a simple condition and feel like you just dont want to get out of bed, or cant get away from work for an appointment, when your regular [employer] provider is not available (evenings, weekends or holidays), or when youre out of town and need minor care. Electronic visits cost only $49 and if the [employer] 24/7 provider determines a prescription is needed to treat your condition, one can be electronically transmitted to a nearby pharmacy*. Please take a moment to enroll today if you have not already done so.   The enrollment process is free and takes just a few minutes. To enroll, please download the [employer] 24/7 zenaida to your tablet or phone, or visit [email or web address] to enroll on your computer. And, as an [address] [place] [address] patient with a [employer] account, the results of your visits will be scanned into your electronic medical record and your primary care provider will be able to view the scanned results. We urge you to continue to see your regular [employer] provider for your ongoing medical care. And while your primary care provider may not be the one available when you seek a [employer] virtual visit, the peace of mind you get from getting a real diagnosis real time can be priceless. For more information on [employer], view our Frequently Asked Questions (FAQs) at [email or web address]. Sincerely, 
 
Nirmala Chapa MD 
Chief Medical Officer [address] *:  certain medications cannot be prescribed via [employer] Providers Seen During Your Hospitalization Provider Specialty Primary office phone Bridget Toro MD Emergency Medicine [phone] Chiki Conley MD Internal Medicine [phone] Jenniffer Jones MD Internal Medicine [phone] Your Primary Care Physician (PCP) Primary Care Physician Office Phone Office Fax Stella Navarrete [phone] You are allergic to the following Allergen Reactions Augmentin (Amoxicillin-Pot Clavulanate) Hives Recent Documentation Height Weight Breastfeeding? BMI OB Status Smoking Status 1.651 m 60.6 kg No 22.23 kg/m2 Hysterectomy Never Smoker Emergency Contacts Name Discharge Info Relation Home Work Mobile Sekou Martínez DISCHARGE CAREGIVER [3] Spouse [3] [phone] Patient Belongings The following personal items are in your possession at time of discharge: Dental Appliances: None  Visual Aid: Glasses      Home Medications: None   Jewelry: Ring, Earrings, With patient  Clothing: Dress, Jacket/Coat, Footwear, Undergarments, With patient    Other Valuables: Debra Sessions, Cell Phone, Eyeglasses, Money (comment), With patient ($70) Please provide this summary of care documentation to your next provider. Signatures-by signing, you are acknowledging that this After Visit Summary has been reviewed with you and you have received a copy. Patient Signature:  ____________________________________________________________ Date:  ____________________________________________________________  
  
[facility] Signature:  ____________________________________________________________ Date:  ____________________________________________________________

## 2018-07-16 NOTE — ED NOTES
Bedside shift change report given to Nini Medina RN (oncoming nurse) by Shade Armando RN (offgoing nurse). Report included the following information SBAR, Kardex, ED Summary, Intake/Output, MAR and Recent Results.

## 2018-07-17 ENCOUNTER — APPOINTMENT (OUTPATIENT)
Dept: NUCLEAR MEDICINE | Age: 63
DRG: 312 | End: 2018-07-17
Attending: NURSE PRACTITIONER
Payer: COMMERCIAL

## 2018-07-17 VITALS
BODY MASS INDEX: 22.26 KG/M2 | SYSTOLIC BLOOD PRESSURE: 128 MMHG | HEART RATE: 77 BPM | DIASTOLIC BLOOD PRESSURE: 86 MMHG | OXYGEN SATURATION: 97 % | WEIGHT: 133.6 LBS | TEMPERATURE: 97.9 F | HEIGHT: 65 IN | RESPIRATION RATE: 16 BRPM

## 2018-07-17 LAB
ANION GAP SERPL CALC-SCNC: 7 MMOL/L (ref 5–15)
ATTENDING PHYSICIAN, CST07: NORMAL
BUN SERPL-MCNC: 13 MG/DL (ref 6–20)
BUN/CREAT SERPL: 13 (ref 12–20)
CALCIUM SERPL-MCNC: 8.2 MG/DL (ref 8.5–10.1)
CHLORIDE SERPL-SCNC: 107 MMOL/L (ref 97–108)
CO2 SERPL-SCNC: 25 MMOL/L (ref 21–32)
CREAT SERPL-MCNC: 0.98 MG/DL (ref 0.55–1.02)
DIAGNOSIS, 93000: NORMAL
DUKE TM SCORE RESULT, CST14: NORMAL
DUKE TREADMILL SCORE, CST13: 5456
ECG INTERP BEFORE EX, CST11: NORMAL
ECG INTERP DURING EX, CST12: NORMAL
FUNCTIONAL CAPACITY, CST17: NORMAL
GLUCOSE SERPL-MCNC: 86 MG/DL (ref 65–100)
KNOWN CARDIAC CONDITION, CST08: NORMAL
MAX. DIASTOLIC BP, CST04: 73 MMHG
MAX. HEART RATE, CST05: 110 BPM
MAX. SYSTOLIC BP, CST03: 127 MMHG
OVERALL BP RESPONSE TO EXERCISE, CST16: NORMAL
OVERALL HR RESPONSE TO EXERCISE, CST15: NORMAL
PEAK EX METS, CST10: 1 METS
POTASSIUM SERPL-SCNC: 3.4 MMOL/L (ref 3.5–5.1)
PROTOCOL NAME, CST01: NORMAL
REASON FOR TERMINATION: 68 BPM
SODIUM SERPL-SCNC: 139 MMOL/L (ref 136–145)
TEST INDICATION, CST09: NORMAL
TIME IN EXERCISE PHASE, CST02: NORMAL
TROPONIN I SERPL-MCNC: <0.05 NG/ML

## 2018-07-17 PROCEDURE — 74011250636 HC RX REV CODE- 250/636: Performed by: INTERNAL MEDICINE

## 2018-07-17 PROCEDURE — 74011250636 HC RX REV CODE- 250/636

## 2018-07-17 PROCEDURE — 74011250637 HC RX REV CODE- 250/637: Performed by: INTERNAL MEDICINE

## 2018-07-17 PROCEDURE — 93306 TTE W/DOPPLER COMPLETE: CPT

## 2018-07-17 PROCEDURE — 80048 BASIC METABOLIC PNL TOTAL CA: CPT | Performed by: INTERNAL MEDICINE

## 2018-07-17 PROCEDURE — A9500 TC99M SESTAMIBI: HCPCS

## 2018-07-17 PROCEDURE — 84484 ASSAY OF TROPONIN QUANT: CPT | Performed by: INTERNAL MEDICINE

## 2018-07-17 PROCEDURE — 36415 COLL VENOUS BLD VENIPUNCTURE: CPT | Performed by: INTERNAL MEDICINE

## 2018-07-17 PROCEDURE — 93017 CV STRESS TEST TRACING ONLY: CPT

## 2018-07-17 RX ORDER — POTASSIUM CHLORIDE 20 MEQ/1
40 TABLET, EXTENDED RELEASE ORAL
Status: COMPLETED | OUTPATIENT
Start: 2018-07-17 | End: 2018-07-17

## 2018-07-17 RX ORDER — SODIUM CHLORIDE 0.9 % (FLUSH) 0.9 %
SYRINGE (ML) INJECTION
Status: COMPLETED
Start: 2018-07-17 | End: 2018-07-17

## 2018-07-17 RX ADMIN — ASPIRIN 81 MG 81 MG: 81 TABLET ORAL at 11:59

## 2018-07-17 RX ADMIN — Medication 10 ML: at 13:30

## 2018-07-17 RX ADMIN — TRAZODONE HYDROCHLORIDE 50 MG: 50 TABLET ORAL at 00:15

## 2018-07-17 RX ADMIN — Medication 10 ML: at 09:43

## 2018-07-17 RX ADMIN — Medication 10 ML: at 03:35

## 2018-07-17 RX ADMIN — POTASSIUM CHLORIDE 40 MEQ: 1500 TABLET, EXTENDED RELEASE ORAL at 12:49

## 2018-07-17 RX ADMIN — SODIUM CHLORIDE 75 ML/HR: 900 INJECTION, SOLUTION INTRAVENOUS at 08:06

## 2018-07-17 RX ADMIN — ACETAMINOPHEN 650 MG: 325 TABLET ORAL at 00:15

## 2018-07-17 RX ADMIN — REGADENOSON 0.4 MG: 0.08 INJECTION, SOLUTION INTRAVENOUS at 09:44

## 2018-07-17 NOTE — PROGRESS NOTES
Hospitalist Progress Note NAME: Kierra Byrd :  1955 MRN:  [id number] Assessment / Plan: 1. Syncope:unclear cause - possibly orthostatic changes vs cardiac event. 
 -Admitted to telemetry. 
 -Orthostatics reviewed 
 -EKG nl 
 -Cardiac enzymes wnl 
 -NM Card stress:no ischemia demonstrated. No infarct visible. LVEF 61%. 
   
2.TRAM  
 -Creatinine 1.27 POA 
 -Resolved,creat 0.98 today 3. Hypokalemia 
 -Will replace - Kcl 40 meq x 1 dose 4. Fall 
 -Fall precautions 5. Asthma. Condition is stable. We will continue with the Singulair and albuterol inhalers as prescribed Body mass index is 22.23 kg/(m^2). Code status:full code Prophylaxis:per protocol Recommended Disposition: Subjective: Chief Complaint / Reason for Physician Visit Admitted for syncope,fall. No recurrence of event since admission Discussed with RN events overnight. Review of Systems: 
Symptom Y/N Comments  Symptom Y/N Comments Fever/Chills n   Chest Pain n   
Poor Appetite n   Edema n   
Cough n   Abdominal Pain Sputum    Joint Pain SOB/GARCIA n   Pruritis/Rash Nausea/vomit n   Tolerating PT/OT Diarrhea    Tolerating Diet Constipation    Other Could NOT obtain due to:   
 
Objective: VITALS:  
Last 24hrs VS reviewed since prior progress note. Most recent are: 
Patient Vitals for the past 24 hrs: 
 Temp Pulse Resp BP SpO2  
18 0743 97.4 °F (36.3 °C) 72 18 114/71 92 % 18 0234 97.3 °F (36.3 °C) 63 20 99/57 92 % 18 0011 97.4 °F (36.3 °C) 73 20 117/76 93 % 18 1950 - - - 115/85 -  
18 - - - 123/81 -  
18 194 - 78 - 125/73 92 % 18 98.1 °F (36.7 °C) 75 18 125/73 93 % 18 1830 - (!) 25 - 141/90 93 % 18 1745 - 81 - (!) 136/92 93 % 18 1700 - 79 - (!) 128/91 90 % 18 1615 - 98 - (!) 134/91 92 % 18 1606 - 91 - (!) 122/91 92 % 18 1605 - 85 - (!) 134/96 93 % 18 1603 - 79 - 137/83 94 % 07/16/18 1530 - - - (!) 112/92 93 % 07/16/18 1518 - 83 14 119/83 94 % 07/16/18 1516 - - - 119/83 -  
07/16/18 1216 98.1 °F (36.7 °C) 94 18 118/87 95 % No intake or output data in the 24 hours ending 07/17/18 1146 PHYSICAL EXAM: 
General: WD, WN. Alert, cooperative, no acute distress   
EENT:  EOMI. Anicteric sclerae. MMM Resp:  CTA bilaterally, no wheezing or rales. No accessory muscle use CV:  Regular  rhythm,  No edema GI:  Soft, Non distended, Non tender.  +Bowel sounds Neurologic:  Alert and oriented X 3, normal speech, Psych:   Good insight. Not anxious nor agitated Skin:  No rashes. No jaundice Reviewed most current lab test results and cultures  YES Reviewed most current radiology test results   YES Review and summation of old records today    NO Reviewed patient's current orders and MAR    YES 
PMH/SH reviewed - no change compared to H&P 
________________________________________________________________________ Care Plan discussed with: 
  Comments Patient x Family RN x Care Manager Consultant Multidiciplinary team rounds were held today with , nursing, pharmacist and clinical coordinator. Patient's plan of care was discussed; medications were reviewed and discharge planning was addressed. ________________________________________________________________________ Total NON critical care TIME: 30  Minutes Total CRITICAL CARE TIME Spent:   Minutes non procedure based Comments >50% of visit spent in counseling and coordination of care x   
________________________________________________________________________ Gab Cutler MD  
 
Procedures: see electronic medical records for all procedures/Xrays and details which were not copied into this note but were reviewed prior to creation of Plan. LABS: 
I reviewed today's most current labs and imaging studies.  
Pertinent labs include: 
Recent Labs 07/16/18 
 1236 WBC  9.8 HGB  15.7 HCT  48.6*  
PLT  273 Recent Labs  
   07/17/18 
 0330  07/16/18 
 1236 NA  139  141  
K  3.4*  3.4*  
CL  107  104 CO2  25  29 GLU  86  94 BUN  13  13 CREA  0.98  1.27* CA  8.2*  9.2 ALB   --   4.2 TBILI   --   0.9 SGOT   --   29 ALT   --   21 Signed: Bard Edmund MD

## 2018-07-17 NOTE — PROGRESS NOTES
Pt discharged and f/u appt made and documented on the discharge paperwork. Pt's  transported pt home by car. Care Management Interventions  PCP Verified by CM: Yes (Dr. Soila Tenorio)  Mode of Transport at Discharge:  Other (see comment) (pt's  transported pt home by car)  Hospital Transport Time of Discharge: [place]in[place] (CM Consult): Discharge Planning  Discharge Durable Medical Equipment: No  Physical Therapy Consult: No  Occupational Therapy Consult: No  Speech Therapy Consult: No  Current Support Network: Own Home, Lives with Spouse (lives with her  in a 1 story home with 5 steps to the entrance)  Confirm Follow Up Transport: Family  Plan discussed with Pt/Family/Caregiver: Yes  Discharge Location  Discharge Placement: [address]

## 2018-07-17 NOTE — PROGRESS NOTES
Pt is a 57 y.o  female admitted with Syncope. Pt was alert, oriented and in no distress resting in bed. Demographic information verified and all is correct. Pt lives with her  in a 1 story home with 5 steps to the entrance. Prior to admission, pt was independent with her ADL's and IADL's. Pt works for the [employer] of [employer]. Denies using DME or having home health and rehab in the past. Preferred pharmacy is [facility] in [place]. Pt's  can transport pt home by car. CM will continue to follow pt for discharge planning needs. Reason for Admission:   Syncope                   RRAT Score:      0               Plan for utilizing home health:      no                    Likelihood of Readmission:  low                         Transition of Care Plan:               Home with f/u appts    Care Management Interventions  PCP Verified by CM: Yes (Dr. Bucky Carrillo)  Mode of Transport at Discharge:  Other (see comment) (pt's  will transport by car)  Transition of Care Consult (CM Consult): Discharge Planning  Discharge Durable Medical Equipment: No  Physical Therapy Consult: No  Occupational Therapy Consult: No  Speech Therapy Consult: No  Current Support Network: Own Home, Lives with Spouse (lives with her  in a 1 story home with 5 steps to the entrance)  Confirm Follow Up Transport: Family  Discharge Location  Discharge Placement: [address] [facility] [address], [address]

## 2018-07-17 NOTE — DISCHARGE SUMMARY
Discharge Summary    Patient: Gus Mcdaniel               Sex: female          DOA: 7/16/2018         YOB: 1955      Age:  57 y.o.        LOS:  LOS: 1 day                Admit Date: 7/16/2018    Discharge Date: 7/17/2018    Admission Diagnoses: Syncope    Discharge Diagnoses:  Syncope  TRAM  Dehydration  Hypokalemia  Fall    Christianne-orbital hematoma rt. H/o asthma    Problem List as of 7/17/2018  Date Reviewed: 7/16/2018          Codes Class Noted - Resolved    Syncope ICD-10-CM: R55  ICD-9-CM: 780.2  7/16/2018 - Present              Discharge Medications:     Current Discharge Medication List      CONTINUE these medications which have NOT CHANGED    Details   albuterol (PROAIR HFA) 90 mcg/actuation inhaler Take 2 Puffs by inhalation every six (6) hours as needed for Wheezing. doxylamine succinate (SLEEP AID, DOXYLAMINE,) 25 mg tablet Take 50 mg by mouth nightly as needed for Sleep.      traMADol (ULTRAM) 50 mg tablet Take 50 mg by mouth every six (6) hours as needed for Pain.      famotidine (PEPCID PO) Take 1 Tab by mouth daily as needed (\"GERD\"). acetaminophen (TYLENOL) 325 mg tablet Take 325 mg by mouth every four (4) hours as needed for Pain.      montelukast (SINGULAIR) 10 mg tablet Take 10 mg by mouth daily as needed (\"Allergies\").              Follow-up:pcp    Discharge Condition:good    Activity:as tolerated    Diet:regular    Disposition:Home    Labs:  Labs: Results:       Chemistry Recent Labs      07/17/18   0330  07/16/18   1236   GLU  86  94   NA  139  141   K  3.4*  3.4*   CL  107  104   CO2  25  29   BUN  13  13   CREA  0.98  1.27*   CA  8.2*  9.2   AGAP  7  8   BUCR  13  10*   AP   --   81   TP   --   7.5   ALB   --   4.2   GLOB   --   3.3   AGRAT   --   1.3      CBC w/Diff Recent Labs      07/16/18   1236   WBC  9.8   RBC  4.94   HGB  15.7   HCT  48.6*   PLT  273   GRANS  68   LYMPH  13   EOS  9*      Cardiac Enzymes No results for input(s): CPK, CKND1, MACKENZIE in the last 72 hours.    No lab exists for component: CKRMB, TROIP   Coagulation No results for input(s): PTP, INR, APTT in the last 72 hours. No lab exists for component: INREXT    Lipid Panel No results found for: CHOL, CHOLPOCT, CHOLX, CHLST, CHOLV, 498151, HDL, LDL, LDLC, DLDLP, 053770, VLDLC, VLDL, TGLX, TRIGL, TRIGP, TGLPOCT, CHHD, CHHDX   BNP No results for input(s): BNPP in the last 72 hours. Liver Enzymes Recent Labs      07/16/18   1236   TP  7.5   ALB  4.2   AP  81   SGOT  29      Thyroid Studies No results found for: T4, T3U, TSH, TSHEXT       Imaging:  CT head on 7/16:  No acute intracranial abnormality. CT maxillofacial:  No facial fracture.       Consults: Cardiology    Treatment Team: Treatment Team: Attending Provider: Bard Edmund MD; Consulting Provider: Karina Sanders MD; Utilization Review: Maggie Baptiste; Care Manager: Hayden Velásquez RN    Significant Diagnostic Studies: labs: see recent lab results    NM Cardiology Spect  IMPRESSION: No ischemia demonstrated. No infarct visible. LVEF 61%.           ECHO on 7/17:  Left ventricle: Systolic function was normal. Ejection fraction was  estimated in the range of 55 % to 60 %. There were no regional wall motion  abnormalities. Wall thickness was at the upper limits of normal.    HISTORY OF PRESENT ILLNESS:  Patient is a 57-year-old female with a past medical history of asthma. As per the patient, yesterday she was in the swimming pool for several hours in the heat. Later that evening had returned back home to her . While getting inside the house, passed out. The  had witnessed the fall and as per the patient, has been told that she was out for only few seconds, ended up hitting the right side of the face and the forehead onto the hardwood floor causing significant soft tissue injury on the right side of the face, in the periorbital region, the forehead.   The patient early after waking up this morning, had noted that the swelling had got worse, returned, came to the ER for further evaluation. In the ER, the patient had a head CT done which was essentially negative for any intracranial bleed. At this time, the patient complains of localized soreness in the forehead, in the periorbital region, on the right side of the face. Otherwise, there is no complaints of any nausea, any vomiting prior to passing out. The patient denies having any chest pain, shortness of breath or any numbness of the left upper extremity. After waking up after the syncopal episode, the patient was pretty much alert and awake. No signs of any seizure-like activity was noted, but was witnessed by the . The patient did not get incontinence. Hospital Course: 1. Syncope:unclear cause    -possibly orthostatic changes vs cardiac event.   -Admitted to telemetry.   -Orthostatics not orthostatic   -No cardiac event reported on telemetry. -EKG nl   -Cardiac enzymes wnl   -NM Card stress:no ischemia demonstrated. No infarct visible. LVEF 61%. -ECHO c/w EF 55-60%,without abnormality   -No recurrence of symptoms since admission.   -Clinically stable for discharge.      2. TRAM due to dehydration   -Creatinine 1.27 POA   -Resolved,creat 0.98 today     3. Hypokalemia - K 3.4   -Kcl 40 meq x 1 dose     4. Fall   -Fall precautions    5. Christianne-orbital hematoma   -Resolving     6. Asthma.  Condition is stable. On Singulair and albuterol inhalers      Patient evaluated and determined clinically stable for discharge.     Time for discharge:35 minutes.       Herminio Hernandez MD  July 17, 2018

## 2018-07-17 NOTE — DISCHARGE INSTRUCTIONS
Fainting: Care Instructions  Your Care Instructions    When you faint, or pass out, you lose consciousness for a short time. A brief drop in blood flow to the brain often causes it. When you fall or lie down, more blood flows to your brain and you regain consciousness. Emotional stress, pain, or overheating-especially if you have been standing-can make you faint. In these cases, fainting is usually not serious. But fainting can be a sign of a more serious problem. Your doctor may want you to have more tests to rule out other causes. The treatment you need depends on the reason why you fainted. The doctor has checked you carefully, but problems can develop later. If you notice any problems or new symptoms, get medical treatment right away. Follow-up care is a key part of your treatment and safety. Be sure to make and go to all appointments, and call your doctor if you are having problems. It's also a good idea to know your test results and keep a list of the medicines you take. How can you care for yourself at home? · Drink plenty of fluids to prevent dehydration. If you have kidney, heart, or liver disease and have to limit fluids, talk with your doctor before you increase your fluid intake. When should you call for help? Call 911 anytime you think you may need emergency care. For example, call if:    · You have symptoms of a heart problem. These may include:  ¨ Chest pain or pressure. ¨ Severe trouble breathing. ¨ A fast or irregular heartbeat. ¨ Lightheadedness or sudden weakness. ¨ Coughing up pink, foamy mucus. ¨ Passing out. After you call 911, the  may tell you to chew 1 adult-strength or 2 to 4 low-dose aspirin. Wait for an ambulance. Do not try to drive yourself.     · You have symptoms of a stroke. These may include:  ¨ Sudden numbness, tingling, weakness, or loss of movement in your face, arm, or leg, especially on only one side of your body. ¨ Sudden vision changes.   ¨ Sudden trouble speaking. ¨ Sudden confusion or trouble understanding simple statements. ¨ Sudden problems with walking or balance. ¨ A sudden, severe headache that is different from past headaches.     · You passed out (lost consciousness) again.    Watch closely for changes in your health, and be sure to contact your doctor if:    · You do not get better as expected. Where can you learn more? Go to http://gordon-ollie.info/. Enter K046 in the search box to learn more about \"Fainting: Care Instructions. \"  Current as of: November 20, 2017  Content Version: 11.7  © 5115-3363 [employer]. Care instructions adapted under license by [employer] (which disclaims liability or warranty for this information). If you have questions about a medical condition or this instruction, always ask your healthcare professional. [address] any warranty or liability for your use of this information. Other instructions:  -Take your medications as prescribed. -Follow up with pcp  -Seek medical attention if recurrence of symptoms.

## 2018-07-17 NOTE — ROUTINE PROCESS
Bedside and Verbal shift change report given to Janie Govea RN (oncoming nurse) by Juan Carlos Rollins nurseNarayan. Report included the following information SBAR, Kardex, Recent Results, Med Rec Status and Cardiac Rhythm SR.      Zone Phone:   [phone]        Significant changes during shift:  New Admit    Patient Information      57 yr old, admitted on 7/16/18, patient of Dr. Kalina Kay, admitted from home.      Problem List         Past Medical History:   Diagnosis Date    Asthma      Skin cancer      Sun-damaged skin      Tanning bed exposure         Social History                Social History    Marital status:        Spouse name: N/A    Number of children: N/A    Years of education: N/A              Social History Main Topics    Smoking status: Never Smoker    Smokeless tobacco: Never Used    Alcohol use Yes          Comment: socially    Drug use: No    Sexual activity: Yes       Birth control/ protection: Surgical           Other Topics Concern    None      Social History Narrative              Allergies   Allergen Reactions    Augmentin [Amoxicillin-Pot Clavulanate]            Core Measures:      CVA: No  CHF: No  PNA: No          Activity Status:      OOB to Chair:  No  Ambulated this shift Yes  Bed Rest No      Supplemental X2: (WD Applicable)      Room air          LINES AND DRAINS:      PIV    DVT prophylaxis:      DVT prophylaxis Med- Yes; Lovenox  DVT prophylaxis SCD or ANTONIO- No       Wounds: (If Applicable)      Wounds- R Knee abrasion    Location  R Knee    Patient Safety:      Falls Score Total Score:  4  Safety Level_______  Bed Alarm On? No  Sitter?  No      Plan for upcoming shift: Nuclear Stress Test, 2 D Echo        Discharge Plan: TBD      Active Consults:  Cardiology

## 2018-07-17 NOTE — PROGRESS NOTES
[address] [address]  [phone] Cardiology Progress Note 7/17/2018 12:30 PM 
 
Admit Date: 7/16/2018 Admit Diagnosis:  
Syncope Subjective:  
 
Leida Litten is a 57 y.o. female with PMH asthma who was admitted for syncope. Overnight events: 
-VSS; Orthostatics negative 
-K 3.4; creat 0.98; trop negative 
-MsGuillermina Nance states she's feeling well today. She has a headache, but she attributes that to not eating. She denies dizziness, chest pain, SOB, palpitations. Visit Vitals  /86 (BP 1 Location: Left arm, BP Patient Position: At rest)  Pulse 77  Temp 97.9 °F (36.6 °C)  Resp 16  
 Ht 5' 5\" (1.651 m)  Wt 60.6 kg (133 lb 9.6 oz)  SpO2 97%  Breastfeeding No  
 BMI 22.23 kg/m2 Current Facility-Administered Medications Medication Dose Route Frequency  sodium chloride (NS) flush 5-10 mL  5-10 mL IntraVENous Q8H  
 sodium chloride (NS) flush 5-10 mL  5-10 mL IntraVENous PRN  
 0.9% sodium chloride infusion  75 mL/hr IntraVENous CONTINUOUS  
 enoxaparin (LOVENOX) injection 40 mg  40 mg SubCUTAneous Q24H  
 albuterol (PROVENTIL HFA, VENTOLIN HFA, PROAIR HFA) inhaler 2 Puff  2 Puff Inhalation Q4H PRN  
 famotidine (PEPCID) tablet 20 mg  20 mg Oral DAILY PRN  
 montelukast (SINGULAIR) tablet 10 mg  10 mg Oral DAILY PRN  
 aspirin chewable tablet 81 mg  81 mg Oral DAILY  acetaminophen (TYLENOL) tablet 650 mg  650 mg Oral Q6H PRN  
 traZODone (DESYREL) tablet 50 mg  50 mg Oral QHS PRN Objective:  
  
Physical Exam: 
General: pleasant  female resting in bed in NAD. R periorbital swelling and ecchymosis improved. Heart: RRR, no m/S3/JVD Lungs: clear Abdomen: Soft, +BS, NTND Extremities: LE felisa +DP/PT, no edema Neurologic: Grossly normal 
Skin:  Warm and dry.  
 
Data Review:  
Recent Labs  
   07/16/18 
 1236 WBC  9.8 HGB  15.7 HCT  48.6*  
PLT  273 Recent Labs  
   07/17/18 
 6545 07/16/18 
 1236 NA  139  141  
K  3.4*  3.4*  
CL  107  104 CO2  25  29 GLU  86  94 BUN  13  13 CREA  0.98  1.27* CA  8.2*  9.2 ALB   --   4.2 TBILI   --   0.9 SGOT   --   29 ALT   --   21 Recent Labs  
   07/17/18 
 0330  07/16/18 
 2100  07/16/18 
 1236 TROIQ  <0.05  <0.05  <0.05  
CKMB   --    --   3.4 No intake or output data in the 24 hours ending 07/17/18 1403 Telemetry: NSR 
ECG: NSR Echocardiogram: ordered Stress test:  No ischemia CT head: no acute process CT maxillofacial: no fracture Assessment:  
 
Active Problems: 
  Syncope (7/16/2018) Plan:  
 
Syncope: occurred while walking in her house. Had been outside all day without much intake. Slightly dehydration on admission, but negative orthostatics. Troponin negative. K slightly low. · Stress test negative for ischemia or infarct · No ectopy on telemetry · Awaiting ECHO results · If ECHO without new concerns, patient is okay for discharge from a cardiology perspective. Cora Cerda NP 
DNP, RN, AGACNP-BC Pt seen and examined in details. Agree with NP A&P. D/w pt.   
 
Geeta Sr MD

## 2018-07-17 NOTE — H&P
[address]  HISTORY AND PHYSICAL      Name:Edwina OSORIO  MR#: [id number]  : 1955  ACCOUNT #: [de-identified]   ADMIT DATE: 2018    CHIEF COMPLAINT:  One episode of syncope last night. HISTORY OF PRESENT ILLNESS:  Patient is a 77-year-old female with a past medical history of asthma. As per the patient, yesterday she was in the swimming pool for several hours in the heat. Later that evening had returned back home to her . While getting inside the house, passed out. The  had witnessed the fall and as per the patient, has been told that she was out for only few seconds, ended up hitting the right side of the face and the forehead onto the hardwood floor causing significant soft tissue injury on the right side of the face, in the periorbital region, the forehead. The patient early after waking up this morning, had noted that the swelling had got worse, returned, came to the ER for further evaluation. In the ER, the patient had a head CT done which was essentially negative for any intracranial bleed. At this time, the patient complains of localized soreness in the forehead, in the periorbital region, on the right side of the face. Otherwise, there is no complaints of any nausea, any vomiting prior to passing out. The patient denies having any chest pain, shortness of breath or any numbness of the left upper extremity. After waking up after the syncopal episode, the patient was pretty much alert and awake. No signs of any seizure-like activity was noted, but was witnessed by the . The patient did not get incontinence. PAST MEDICAL HISTORY:  History of asthma, history of skin cancer. ALLERGIES:  AUGMENTIN, GETS HIVES. MEDICATIONS:  Currently taking albuterol inhalers 2 puffs every 4-6 hours. FAMILY HISTORY:  Father  of lung cancer. Mother is alive, hypertensive, at the age of 80. SOCIAL HISTORY:  Nonsmoker. Alcohol, social drinker.   No drugs.    REVIEW OF SYSTEMS:  CONSTITUTIONAL:  Negative fevers, negative chills. Negative night sweats. EYES:  Negative vision problems, negative eye pain. HEENT:  Negative sore throat. Negative postnasal drip, negative earaches. PULMONARY:  Negative cough. Negative wheezing. Negative pleuritic chest pain. CARDIOVASCULAR:  Negative chest pain, negative dyspnea on exertion, negative lower extremity swelling. GASTROINTESTINAL:  Negative abdominal pain. Negative vomiting. Negative diarrhea. GENITOURINARY:  Negative urgency, negative frequency, negative dysuria. PHYSICAL EXAMINATION:  GENERAL:  The patient is alert, awake, oriented x3. HEAD, EARS, EYES, NOSE, THROAT:  Forehead:  Soft tissue swelling was noted over the forehead. Some swelling was also noted over the maxillary region. Oropharynx was normal.  NECK:  No acute findings were noted. Necessary, otherwise supple. LUNGS:  Clear. HEART:  S1, S2 audible. No S3, S4.  ABDOMEN:  Soft, nontender, no guarding, no rigidity, no rebound. EXTREMITIES:  There was no edema. LABORATORY DATA:  WBC count was within normal limits. Urine was normal.    ASSESSMENT:  The patient is a 68-year-old  with a history of asthma, presents with one episode of syncope. PLAN:  1. Syncope. The patient will be ruled out for MI. We will start the patient on aspirin. The cardiology service has evaluated the patient. The patient is scheduled to undergo a stress test and 2D echo in the morning. We will follow their recommendations for now. 2.  Asthma. Condition is stable. We will continue with the Singulair and albuterol inhalers as prescribed. 3.  CODE STATUS:  FULL CODE. 4.  DVT prophylaxis. Lovenox was given to the patient.       MD NONI Sun/MN  D: 07/16/2018 19:24     T: 07/16/2018 20:45  JOB #: [id number]

## 2018-07-31 ENCOUNTER — APPOINTMENT (OUTPATIENT)
Dept: GENERAL RADIOLOGY | Age: 63
DRG: 189 | End: 2018-07-31
Attending: EMERGENCY MEDICINE
Payer: COMMERCIAL

## 2018-07-31 ENCOUNTER — APPOINTMENT (OUTPATIENT)
Dept: CT IMAGING | Age: 63
DRG: 189 | End: 2018-07-31
Attending: EMERGENCY MEDICINE
Payer: COMMERCIAL

## 2018-07-31 ENCOUNTER — HOSPITAL ENCOUNTER (INPATIENT)
Age: 63
LOS: 2 days | Discharge: HOME | DRG: 189 | End: 2018-08-02
Attending: EMERGENCY MEDICINE | Admitting: INTERNAL MEDICINE
Payer: COMMERCIAL

## 2018-07-31 DIAGNOSIS — J45.901: Primary | ICD-10-CM

## 2018-07-31 DIAGNOSIS — D72.829 LEUKOCYTOSIS, UNSPECIFIED TYPE: ICD-10-CM

## 2018-07-31 DIAGNOSIS — F41.9 ANXIETY: ICD-10-CM

## 2018-07-31 DIAGNOSIS — J96.01 ACUTE RESPIRATORY FAILURE WITH HYPOXIA AND HYPERCARBIA (HCC): ICD-10-CM

## 2018-07-31 DIAGNOSIS — J96.02 ACUTE RESPIRATORY FAILURE WITH HYPOXIA AND HYPERCARBIA (HCC): ICD-10-CM

## 2018-07-31 PROBLEM — R09.02 HYPOXIA: Status: ACTIVE | Noted: 2018-07-31

## 2018-07-31 LAB
ALBUMIN SERPL-MCNC: 3.5 G/DL (ref 3.5–5)
ALBUMIN/GLOB SERPL: 1.3 {RATIO} (ref 1.1–2.2)
ALP SERPL-CCNC: 64 U/L (ref 45–117)
ALT SERPL-CCNC: 18 U/L (ref 12–78)
ANION GAP SERPL CALC-SCNC: 9 MMOL/L (ref 5–15)
APPEARANCE UR: CLEAR
ARTERIAL PATENCY WRIST A: ABNORMAL
AST SERPL-CCNC: 27 U/L (ref 15–37)
ATRIAL RATE: 125 BPM
BASE DEFICIT BLDA-SCNC: 5 MMOL/L
BASE DEFICIT BLDV-SCNC: 5.3 MMOL/L
BASOPHILS # BLD: 0.1 K/UL (ref 0–0.1)
BASOPHILS NFR BLD: 1 % (ref 0–1)
BDY SITE: ABNORMAL
BDY SITE: ABNORMAL
BILIRUB SERPL-MCNC: 0.5 MG/DL (ref 0.2–1)
BILIRUB UR QL: NEGATIVE
BNP SERPL-MCNC: 89 PG/ML (ref 0–125)
BREATHS.SPONTANEOUS ON VENT: 25
BREATHS.SPONTANEOUS ON VENT: 27
BUN SERPL-MCNC: 9 MG/DL (ref 6–20)
BUN/CREAT SERPL: 7 (ref 12–20)
CALCIUM SERPL-MCNC: 7.9 MG/DL (ref 8.5–10.1)
CALCULATED P AXIS, ECG09: 95 DEGREES
CALCULATED R AXIS, ECG10: 45 DEGREES
CALCULATED T AXIS, ECG11: 62 DEGREES
CHLORIDE SERPL-SCNC: 108 MMOL/L (ref 97–108)
CK MB CFR SERPL CALC: 1.2 % (ref 0–2.5)
CK MB SERPL-MCNC: 2.7 NG/ML (ref 5–25)
CK SERPL-CCNC: 234 U/L (ref 26–192)
CO2 SERPL-SCNC: 23 MMOL/L (ref 21–32)
COLOR UR: ABNORMAL
CREAT SERPL-MCNC: 1.21 MG/DL (ref 0.55–1.02)
D DIMER PPP FEU-MCNC: 0.98 MG/L FEU (ref 0–0.65)
DIAGNOSIS, 93000: NORMAL
DIFFERENTIAL METHOD BLD: ABNORMAL
EOSINOPHIL # BLD: 2 K/UL (ref 0–0.4)
EOSINOPHIL NFR BLD: 14 % (ref 0–7)
EPAP/CPAP/PEEP, PAPEEP: 6
EPAP/CPAP/PEEP, PAPEEP: 6
ERYTHROCYTE [DISTWIDTH] IN BLOOD BY AUTOMATED COUNT: 12.8 % (ref 11.5–14.5)
FIO2 ON VENT: 50 %
FIO2 ON VENT: 50 %
GAS FLOW.O2 SETTING OXYMISER: 4 L/MIN
GLOBULIN SER CALC-MCNC: 2.8 G/DL (ref 2–4)
GLUCOSE SERPL-MCNC: 270 MG/DL (ref 65–100)
GLUCOSE UR STRIP.AUTO-MCNC: 100 MG/DL
HCO3 BLDA-SCNC: 20 MMOL/L (ref 22–26)
HCO3 BLDV-SCNC: 23 MMOL/L (ref 23–28)
HCT VFR BLD AUTO: 43.3 % (ref 35–47)
HGB BLD-MCNC: 13.9 G/DL (ref 11.5–16)
HGB UR QL STRIP: NEGATIVE
IMM GRANULOCYTES # BLD: 0 K/UL (ref 0–0.04)
IMM GRANULOCYTES NFR BLD AUTO: 0 % (ref 0–0.5)
IPAP/PIP, IPAPIP: 12
IPAP/PIP, IPAPIP: 12
KETONES UR QL STRIP.AUTO: NEGATIVE MG/DL
LEUKOCYTE ESTERASE UR QL STRIP.AUTO: NEGATIVE
LYMPHOCYTES # BLD: 1.8 K/UL (ref 0.8–3.5)
LYMPHOCYTES NFR BLD: 13 % (ref 12–49)
MCH RBC QN AUTO: 32.3 PG (ref 26–34)
MCHC RBC AUTO-ENTMCNC: 32.1 G/DL (ref 30–36.5)
MCV RBC AUTO: 100.5 FL (ref 80–99)
MONOCYTES # BLD: 0.3 K/UL (ref 0–1)
MONOCYTES NFR BLD: 2 % (ref 5–13)
NEUTS SEG # BLD: 9.8 K/UL (ref 1.8–8)
NEUTS SEG NFR BLD: 70 % (ref 32–75)
NITRITE UR QL STRIP.AUTO: NEGATIVE
NRBC # BLD: 0 K/UL (ref 0–0.01)
NRBC BLD-RTO: 0 PER 100 WBC
P-R INTERVAL, ECG05: 154 MS
PCO2 BLDA: 39 MMHG (ref 35–45)
PCO2 BLDV: 57 MMHG (ref 41–51)
PH BLDA: 7.33 [PH] (ref 7.35–7.45)
PH BLDV: 7.23 [PH] (ref 7.32–7.42)
PH UR STRIP: 5 [PH] (ref 5–8)
PLATELET # BLD AUTO: 280 K/UL (ref 150–400)
PMV BLD AUTO: 10.2 FL (ref 8.9–12.9)
PO2 BLDA: 163 MMHG (ref 80–100)
PO2 BLDV: 54 MMHG (ref 25–40)
POTASSIUM SERPL-SCNC: 3.5 MMOL/L (ref 3.5–5.1)
PRESSURE SUPPORT SETTING VENT: 6 CM[H2O]
PROT SERPL-MCNC: 6.3 G/DL (ref 6.4–8.2)
PROT UR STRIP-MCNC: NEGATIVE MG/DL
Q-T INTERVAL, ECG07: 308 MS
QRS DURATION, ECG06: 68 MS
QTC CALCULATION (BEZET), ECG08: 444 MS
RBC # BLD AUTO: 4.31 M/UL (ref 3.8–5.2)
RBC MORPH BLD: ABNORMAL
SAO2 % BLD: 99 % (ref 92–97)
SAO2 % BLDV: 82 % (ref 65–88)
SAO2% DEVICE SAO2% SENSOR NAME: ABNORMAL
SAO2% DEVICE SAO2% SENSOR NAME: ABNORMAL
SODIUM SERPL-SCNC: 140 MMOL/L (ref 136–145)
SP GR UR REFRACTOMETRY: 1.01 (ref 1–1.03)
SPECIMEN SITE: ABNORMAL
SPECIMEN SITE: ABNORMAL
TROPONIN I SERPL-MCNC: <0.05 NG/ML
UROBILINOGEN UR QL STRIP.AUTO: 0.2 EU/DL (ref 0.2–1)
VENTILATION MODE VENT: ABNORMAL
VENTRICULAR RATE, ECG03: 125 BPM
WBC # BLD AUTO: 14 K/UL (ref 3.6–11)

## 2018-07-31 PROCEDURE — 97161 PT EVAL LOW COMPLEX 20 MIN: CPT

## 2018-07-31 PROCEDURE — 77030013140 HC MSK NEB VYRM -A

## 2018-07-31 PROCEDURE — 81003 URINALYSIS AUTO W/O SCOPE: CPT | Performed by: EMERGENCY MEDICINE

## 2018-07-31 PROCEDURE — 36600 WITHDRAWAL OF ARTERIAL BLOOD: CPT | Performed by: EMERGENCY MEDICINE

## 2018-07-31 PROCEDURE — 93005 ELECTROCARDIOGRAM TRACING: CPT

## 2018-07-31 PROCEDURE — 71045 X-RAY EXAM CHEST 1 VIEW: CPT

## 2018-07-31 PROCEDURE — 74011250636 HC RX REV CODE- 250/636: Performed by: EMERGENCY MEDICINE

## 2018-07-31 PROCEDURE — 85379 FIBRIN DEGRADATION QUANT: CPT | Performed by: EMERGENCY MEDICINE

## 2018-07-31 PROCEDURE — 94644 CONT INHLJ TX 1ST HOUR: CPT

## 2018-07-31 PROCEDURE — 77030012879 HC MSK CPAP FLL FAC PHIL -B

## 2018-07-31 PROCEDURE — 36415 COLL VENOUS BLD VENIPUNCTURE: CPT | Performed by: EMERGENCY MEDICINE

## 2018-07-31 PROCEDURE — 74011250637 HC RX REV CODE- 250/637: Performed by: EMERGENCY MEDICINE

## 2018-07-31 PROCEDURE — 77030038269 HC DRN EXT URIN PURWCK BARD -A

## 2018-07-31 PROCEDURE — 82803 BLOOD GASES ANY COMBINATION: CPT | Performed by: EMERGENCY MEDICINE

## 2018-07-31 PROCEDURE — 74011636320 HC RX REV CODE- 636/320: Performed by: EMERGENCY MEDICINE

## 2018-07-31 PROCEDURE — 65660000000 HC RM CCU STEPDOWN

## 2018-07-31 PROCEDURE — 83880 ASSAY OF NATRIURETIC PEPTIDE: CPT | Performed by: EMERGENCY MEDICINE

## 2018-07-31 PROCEDURE — 5A09357 ASSISTANCE WITH RESPIRATORY VENTILATION, LESS THAN 24 CONSECUTIVE HOURS, CONTINUOUS POSITIVE AIRWAY PRESSURE: ICD-10-PCS | Performed by: INTERNAL MEDICINE

## 2018-07-31 PROCEDURE — 85025 COMPLETE CBC W/AUTO DIFF WBC: CPT | Performed by: EMERGENCY MEDICINE

## 2018-07-31 PROCEDURE — 71275 CT ANGIOGRAPHY CHEST: CPT

## 2018-07-31 PROCEDURE — 80053 COMPREHEN METABOLIC PANEL: CPT | Performed by: EMERGENCY MEDICINE

## 2018-07-31 PROCEDURE — 74011250637 HC RX REV CODE- 250/637: Performed by: INTERNAL MEDICINE

## 2018-07-31 PROCEDURE — 74011000250 HC RX REV CODE- 250: Performed by: EMERGENCY MEDICINE

## 2018-07-31 PROCEDURE — 84484 ASSAY OF TROPONIN QUANT: CPT | Performed by: EMERGENCY MEDICINE

## 2018-07-31 PROCEDURE — 94660 CPAP INITIATION&MGMT: CPT

## 2018-07-31 PROCEDURE — 99285 EMERGENCY DEPT VISIT HI MDM: CPT

## 2018-07-31 PROCEDURE — 74011250636 HC RX REV CODE- 250/636: Performed by: INTERNAL MEDICINE

## 2018-07-31 PROCEDURE — 82553 CREATINE MB FRACTION: CPT | Performed by: EMERGENCY MEDICINE

## 2018-07-31 RX ORDER — SODIUM CHLORIDE 0.9 % (FLUSH) 0.9 %
10 SYRINGE (ML) INJECTION
Status: COMPLETED | OUTPATIENT
Start: 2018-07-31 | End: 2018-07-31

## 2018-07-31 RX ORDER — SODIUM CHLORIDE 9 MG/ML
50 INJECTION, SOLUTION INTRAVENOUS
Status: COMPLETED | OUTPATIENT
Start: 2018-07-31 | End: 2018-07-31

## 2018-07-31 RX ORDER — SODIUM CHLORIDE 9 MG/ML
100 INJECTION, SOLUTION INTRAVENOUS CONTINUOUS
Status: DISCONTINUED | OUTPATIENT
Start: 2018-07-31 | End: 2018-08-01

## 2018-07-31 RX ORDER — ACETAMINOPHEN 325 MG/1
325 TABLET ORAL
Status: DISCONTINUED | OUTPATIENT
Start: 2018-07-31 | End: 2018-08-02 | Stop reason: HOSPADM

## 2018-07-31 RX ORDER — LANOLIN ALCOHOL/MO/W.PET/CERES
3 CREAM (GRAM) TOPICAL
Status: DISCONTINUED | OUTPATIENT
Start: 2018-07-31 | End: 2018-08-02 | Stop reason: HOSPADM

## 2018-07-31 RX ORDER — ALBUTEROL SULFATE 2.5 MG/.5ML
10 SOLUTION RESPIRATORY (INHALATION) CONTINUOUS
Status: DISCONTINUED | OUTPATIENT
Start: 2018-07-31 | End: 2018-07-31 | Stop reason: HOSPADM

## 2018-07-31 RX ORDER — ENOXAPARIN SODIUM 100 MG/ML
40 INJECTION SUBCUTANEOUS EVERY 24 HOURS
Status: DISCONTINUED | OUTPATIENT
Start: 2018-07-31 | End: 2018-08-02 | Stop reason: HOSPADM

## 2018-07-31 RX ORDER — FAMOTIDINE 20 MG/1
20 TABLET, FILM COATED ORAL DAILY PRN
Status: DISCONTINUED | OUTPATIENT
Start: 2018-07-31 | End: 2018-07-31

## 2018-07-31 RX ORDER — GUAIFENESIN 600 MG/1
600 TABLET, EXTENDED RELEASE ORAL EVERY 12 HOURS
Status: DISCONTINUED | OUTPATIENT
Start: 2018-07-31 | End: 2018-08-02 | Stop reason: HOSPADM

## 2018-07-31 RX ORDER — IPRATROPIUM BROMIDE 0.5 MG/2.5ML
0.5 SOLUTION RESPIRATORY (INHALATION)
Status: DISCONTINUED | OUTPATIENT
Start: 2018-07-31 | End: 2018-08-01

## 2018-07-31 RX ORDER — HYDRALAZINE HYDROCHLORIDE 20 MG/ML
10 INJECTION INTRAMUSCULAR; INTRAVENOUS
Status: DISCONTINUED | OUTPATIENT
Start: 2018-07-31 | End: 2018-07-31

## 2018-07-31 RX ORDER — ONDANSETRON 2 MG/ML
4 INJECTION INTRAMUSCULAR; INTRAVENOUS
Status: DISCONTINUED | OUTPATIENT
Start: 2018-07-31 | End: 2018-08-02 | Stop reason: HOSPADM

## 2018-07-31 RX ORDER — FLUTICASONE PROPIONATE AND SALMETEROL 100; 50 UG/1; UG/1
1 POWDER RESPIRATORY (INHALATION)
Status: DISCONTINUED | OUTPATIENT
Start: 2018-07-31 | End: 2018-07-31

## 2018-07-31 RX ORDER — FAMOTIDINE 20 MG/1
20 TABLET, FILM COATED ORAL DAILY
Status: DISCONTINUED | OUTPATIENT
Start: 2018-07-31 | End: 2018-08-01

## 2018-07-31 RX ORDER — FLUTICASONE FUROATE AND VILANTEROL 100; 25 UG/1; UG/1
1 POWDER RESPIRATORY (INHALATION) DAILY
Status: DISCONTINUED | OUTPATIENT
Start: 2018-07-31 | End: 2018-08-02 | Stop reason: HOSPADM

## 2018-07-31 RX ORDER — SODIUM CHLORIDE 0.9 % (FLUSH) 0.9 %
5-10 SYRINGE (ML) INJECTION AS NEEDED
Status: DISCONTINUED | OUTPATIENT
Start: 2018-07-31 | End: 2018-08-02 | Stop reason: HOSPADM

## 2018-07-31 RX ORDER — SODIUM CHLORIDE 0.9 % (FLUSH) 0.9 %
5-10 SYRINGE (ML) INJECTION EVERY 8 HOURS
Status: DISCONTINUED | OUTPATIENT
Start: 2018-07-31 | End: 2018-08-02 | Stop reason: HOSPADM

## 2018-07-31 RX ORDER — MONTELUKAST SODIUM 10 MG/1
10 TABLET ORAL
Status: DISCONTINUED | OUTPATIENT
Start: 2018-07-31 | End: 2018-07-31 | Stop reason: ALTCHOICE

## 2018-07-31 RX ORDER — LEVALBUTEROL INHALATION SOLUTION 1.25 MG/3ML
1.25 SOLUTION RESPIRATORY (INHALATION)
Status: DISCONTINUED | OUTPATIENT
Start: 2018-07-31 | End: 2018-08-01

## 2018-07-31 RX ADMIN — Medication 10 ML: at 10:13

## 2018-07-31 RX ADMIN — FLUTICASONE FUROATE AND VILANTEROL TRIFENATATE 1 PUFF: 100; 25 POWDER RESPIRATORY (INHALATION) at 13:36

## 2018-07-31 RX ADMIN — SODIUM CHLORIDE 100 ML/HR: 900 INJECTION, SOLUTION INTRAVENOUS at 08:54

## 2018-07-31 RX ADMIN — IOPAMIDOL 100 ML: 755 INJECTION, SOLUTION INTRAVENOUS at 10:13

## 2018-07-31 RX ADMIN — ENOXAPARIN SODIUM 40 MG: 40 INJECTION SUBCUTANEOUS at 08:59

## 2018-07-31 RX ADMIN — Medication 10 ML: at 08:54

## 2018-07-31 RX ADMIN — ACETAMINOPHEN 325 MG: 325 TABLET ORAL at 21:00

## 2018-07-31 RX ADMIN — SODIUM CHLORIDE 100 ML/HR: 900 INJECTION, SOLUTION INTRAVENOUS at 21:05

## 2018-07-31 RX ADMIN — SODIUM CHLORIDE 50 ML/HR: 900 INJECTION, SOLUTION INTRAVENOUS at 10:13

## 2018-07-31 RX ADMIN — FAMOTIDINE 20 MG: 20 TABLET ORAL at 13:36

## 2018-07-31 RX ADMIN — ALBUTEROL SULFATE 10 MG/HR: 2.5 SOLUTION RESPIRATORY (INHALATION) at 06:19

## 2018-07-31 RX ADMIN — METHYLPREDNISOLONE SODIUM SUCCINATE 40 MG: 40 INJECTION, POWDER, FOR SOLUTION INTRAMUSCULAR; INTRAVENOUS at 13:37

## 2018-07-31 RX ADMIN — GUAIFENESIN 600 MG: 600 TABLET, EXTENDED RELEASE ORAL at 08:59

## 2018-07-31 RX ADMIN — Medication 10 ML: at 21:01

## 2018-07-31 RX ADMIN — GUAIFENESIN 600 MG: 600 TABLET, EXTENDED RELEASE ORAL at 21:00

## 2018-07-31 RX ADMIN — ACETAMINOPHEN 325 MG: 325 TABLET ORAL at 13:36

## 2018-07-31 RX ADMIN — METHYLPREDNISOLONE SODIUM SUCCINATE 40 MG: 40 INJECTION, POWDER, FOR SOLUTION INTRAMUSCULAR; INTRAVENOUS at 19:49

## 2018-07-31 RX ADMIN — MELATONIN TAB 3 MG 3 MG: 3 TAB at 21:00

## 2018-07-31 NOTE — ED NOTES
RT removed BIPAP and pt placed on 3L nasal cannula. Pt tolerating well. Pt will be taken to CT and then upstairs to IP room

## 2018-07-31 NOTE — PROGRESS NOTES
Verbal report given to oncoming nurse Marylene Golds, RN Report consisted of patients Situation, Background, Assessment, and  
Recommendations Information was reviewed with the receiving nurse. Opportunity for questions and clarification was provided.

## 2018-07-31 NOTE — IP AVS SNAPSHOT
[address] [facility] 
[phone] Patient: Leida Litten MRN: [id number] :1955 About your hospitalization You were admitted on:  2018 You last received care in the:  [facility] PROGRESSIVE CARE You were discharged on:  2018 Why you were hospitalized Your primary diagnosis was:  Not on File Your diagnoses also included:  Hypoxia, Asthma Exacerbation, Anxiety Follow-up Information Follow up With Details Comments Contact Teresita Alejandre Go on 2018 Hospital follow-up scheduled at 10:00am ( If you have questions or need to reschedule please call [phone] [facility] [address] [phone] Ramesh Cuevas MD Schedule an appointment as soon as possible for a visit in 2 weeks for hiatal hernia on CT scan [address] [facility] [address] [facility] 
[phone] Lisa Antis, 299 [facility] [address] [facility] [address] [phone] Your Scheduled Appointments   1:00 PM EDT  
ESTABLISHED PATIENT with Jared Hutchison MD  
[address] [facility]) [address]  
[phone] Discharge Orders None A check mavis indicates which time of day the medication should be taken. My Medications START taking these medications Instructions Each Dose to Equal  
 Morning Noon Evening Bedtime  
 albuterol-ipratropium 2.5 mg-0.5 mg/3 ml Nebu Commonly known as:  Maylon Reagin Your last dose was: Your next dose is:    
   
   
 3 mL by Nebulization route every four (4) hours as needed. 3 mL ALPRAZolam 0.25 mg tablet Commonly known as:  Carlabrahana Justoer Your last dose was: Your next dose is: Take 1 Tab by mouth daily as needed for Anxiety. Indications: anxiety 0.25 mg  
    
   
   
   
  
 benzonatate 200 mg capsule Commonly known as:  TESSALON Your last dose was: Your next dose is:    
   
   
 Every 8 hours for 7 days then every 8 hours as needed  
     
   
   
   
  
 fluticasone-vilanterol 100-25 mcg/dose inhaler Commonly known as:  BREO ELLIPTA Start taking on:  8/3/2018 Your last dose was: Your next dose is: Take 1 Puff by inhalation daily. 1 Puff  
    
   
   
   
  
 guaiFENesin  mg ER tablet Commonly known as:  Gustavo & Gustavo Your last dose was: Your next dose is:    
   
   
 Every 12 hours for 7 days then every 12 hours as needed for congestion  
     
   
   
   
  
 levoFLOXacin 750 mg tablet Commonly known as:  Rue Danny Your last dose was: Your next dose is: Take 1 Tab by mouth daily for 5 days. 750 mg  
    
   
   
   
  
 montelukast 10 mg tablet Commonly known as:  SINGULAIR Your last dose was: Your next dose is: Take 1 Tab by mouth nightly. 10 mg Nebulizer & Compressor machine Your last dose was: Your next dose is: To use nebs as directed  
     
   
   
   
  
 omeprazole 20 mg capsule Commonly known as:  PriLOSEC Your last dose was: Your next dose is: Take 1 Cap by mouth Before breakfast and dinner. 20 mg  
    
   
   
   
  
 predniSONE 10 mg tablet Commonly known as:  Annika Howe Your last dose was: Your next dose is:    
   
   
 5 tabs daily for 2 days 4 tabs daily for 2 days  3 tabs daily for 2 days  2 tabs daily for 2 days  1 tab   daily for 2 days CONTINUE taking these medications Instructions Each Dose to Equal  
 Morning Noon Evening Bedtime ALLEGRA-D 12 HOUR PO Your last dose was: Your next dose is: Take 1 Tab by mouth daily as needed (congestion). 1 Tab PROAIR HFA 90 mcg/actuation inhaler Generic drug:  albuterol Your last dose was: Your next dose is: Take 2 Puffs by inhalation every six (6) hours as needed for Wheezing. 2 Puff Sleep Aid (Doxylamine) 25 mg tablet Generic drug:  doxylamine succinate Your last dose was: Your next dose is: Take 50 mg by mouth nightly as needed for Sleep. 50 mg  
    
   
   
   
  
 TYLENOL 325 mg tablet Generic drug:  acetaminophen Your last dose was: Your next dose is: Take 325 mg by mouth every four (4) hours as needed for Pain. 325 mg  
    
   
   
   
  
  
STOP taking these medications PEPCID PO  
   
  
 traMADol 50 mg tablet Commonly known as:  ULTRAM  
   
  
  
  
Where to Get Your Medications Information on where to get these meds will be given to you by the nurse or doctor. ! Ask your nurse or doctor about these medications  
  albuterol-ipratropium 2.5 mg-0.5 mg/3 ml Nebu ALPRAZolam 0.25 mg tablet  
 benzonatate 200 mg capsule  
 fluticasone-vilanterol 100-25 mcg/dose inhaler  
 guaiFENesin  mg ER tablet  
 levoFLOXacin 750 mg tablet  
 montelukast 10 mg tablet Nebulizer & Compressor machine  
 omeprazole 20 mg capsule  
 predniSONE 10 mg tablet Discharge Instructions HOSPITALIST DISCHARGE INSTRUCTIONS 
 
NAME: Wendy Frost :  1955 MRN:  [id number] Date/Time:  2018 11:11 AM 
 
ADMIT DATE: 2018 DISCHARGE DATE: 2018 DISCHARGE DIAGNOSIS: 
Acute respiratory failure With Hypoxia and Hypercapnia POA Asthma Exacerbation due to acute bronchitis GERD Hiatal hernia Mild TRAM MEDICATIONS: 
· It is important that you take the medication exactly as they are prescribed. · Keep your medication in the bottles provided by the pharmacist and keep a list of the medication names, dosages, and times to be taken in your wallet. · Do not take other medications without consulting your doctor. Pain Management: per above medications What to do at [facility] Recommended diet:  Resume previous diet Recommended activity: Activity as tolerated If you have questions regarding the hospital related prescriptions or hospital related issues please call Mi Luque at . If you experience any of the following symptoms then please call your primary care physician or return to the emergency room if you cannot get hold of your doctor: 
Fever, chills, nausea, vomiting, diarrhea, change in mentation, falling, bleeding, shortness of breath Follow Up: 
Dr. Mago Salazar MD  you are to call and set up an appointment to see them in 7-10 days. Information obtained by : 
I understand that if any problems occur once I am at home I am to contact my physician. I understand and acknowledge receipt of the instructions indicated above. Physician's or R.N.'s Signature                                                                  Date/Time Patient or Representative Signature                                                          Date/Time [employer] Announcement We are excited to announce that we are making your provider's discharge notes available to you in [employer]. You will see these notes when they are completed and signed by the physician that discharged you from your recent hospital stay.   If you have any questions or concerns about any information you see in [employer], please call the Health Information Department where you were seen or reach out to your Primary Care Provider for more information about your plan of care. Introducing [facility] [place] [facility]! Dear Janae Limon: Thank you for requesting a [employer] account. Our records indicate that you already have an active [employer] account. You can access your account anytime at [email or web address] Did you know that you can access your hospital and ER discharge instructions at any time in [employer]? You can also review all of your test results from your hospital stay or ER visit. Additional Information If you have questions, please visit the Frequently Asked Questions section of the [employer] website at [email or web address] Remember, [employer] is NOT to be used for urgent needs. For medical emergencies, dial 911. Now available from your iPhone and Android! Introducing Luis Alberto Puckett As a [facility] patient, I wanted to make you aware of our electronic visit tool called Luis Alberto Puckett. [facility] [employer] [facility] 24/7 allows you to connect within minutes with a medical provider 24 hours a day, seven days a week via a mobile device or tablet or logging into a secure website from your computer. You can access Luis Alberto Puckett from anywhere in the [place]. A virtual visit might be right for you when you have a simple condition and feel like you just dont want to get out of bed, or cant get away from work for an appointment, when your regular [facility] provider is not available (evenings, weekends or holidays), or when youre out of town and need minor care. Electronic visits cost only $49 and if the [facility] 24/7 provider determines a prescription is needed to treat your condition, one can be electronically transmitted to a nearby pharmacy*. Please take a moment to enroll today if you have not already done so.   The enrollment process is free and takes just a few minutes. To enroll, please download the [employer] 24/7 zenaida to your tablet or phone, or visit [email or web address] to enroll on your computer. And, as an [address] [place] [address] patient with a [employer] account, the results of your visits will be scanned into your electronic medical record and your primary care provider will be able to view the scanned results. We urge you to continue to see your regular [employer] provider for your ongoing medical care. And while your primary care provider may not be the one available when you seek a [employer] virtual visit, the peace of mind you get from getting a real diagnosis real time can be priceless. For more information on [employer], view our Frequently Asked Questions (FAQs) at [email or web address]. Sincerely, 
 
Amy Mckeon MD 
Chief Medical Officer [facility] *:  certain medications cannot be prescribed via [employer] Providers Seen During Your Hospitalization Provider Specialty Primary office phone Bib Vieira MD Emergency Medicine [phone] Odalis Dash MD Internal Medicine [phone] Your Primary Care Physician (PCP) Primary Care Physician Office Phone Office Fax Elva Keane [phone] You are allergic to the following Allergen Reactions Augmentin (Amoxicillin-Pot Clavulanate) Hives Recent Documentation Height Weight Breastfeeding? BMI OB Status Smoking Status 1.651 m 59 kg No 21.63 kg/m2 Hysterectomy Never Smoker Emergency Contacts Name Discharge Info Relation Home Work Mobile Sekou Martínez DISCHARGE CAREGIVER [3] Spouse [3] [phone] Patient Belongings  The following personal items are in your possession at time of discharge: 
  Dental Appliances: None  Visual Aid: None      Home Medications: None Jewelry: Necklace (gold,  will take home)  Clothing: Dress, Undergarments    Other Valuables: Cell Phone, Money (comment), Purse ($90-$04) Please provide this summary of care documentation to your next provider. Signatures-by signing, you are acknowledging that this After Visit Summary has been reviewed with you and you have received a copy. Patient Signature:  ____________________________________________________________ Date:  ____________________________________________________________  
  
[facility] Gene Provider Signature:  ____________________________________________________________ Date:  ____________________________________________________________

## 2018-07-31 NOTE — PROGRESS NOTES
Occupational Therapy OT consult received, chart reviewed. Initiated OT evaluation, but patient indicates that, other than increased difficulty breathing, she is completely independent with ADL. Noted PT evaluation earlier today. She was independent with mobility. Patient has a long time history of asthma and is expected to recover without skilled therapy services. Will complete the OT order and sign off. Please re-consult if any new needs arise.

## 2018-07-31 NOTE — ED NOTES
Pt and  updated on plan of care.  has taken patient's purse with him. Pt to be taken to CT. Will wait to place purwick until she has returned to room. Pt Dr Arjun Mistry, we can attempt to wean patient from bipap. RT aware and will be down to see patient shortly

## 2018-07-31 NOTE — PROGRESS NOTES
Pharmacy Clarification of the Prior to Admission Medication Regimen Retrospective to the Admission Medication Reconciliation The patient was interviewed regarding clarification of the prior to admission medication regimen and was questioned regarding use of any other inhalers, topical products, over the counter medications, herbal medications, vitamin products or ophthalmic/nasal/otic medication use. Information Obtained From: PatientNelda Recommendations/Findings: The following amendments were made to the patient's active medication list on file at [facility]:  
 
1) Additions:  
 fexofenadine/pseudoephedrine (ALLEGRA-D 12 HOUR PO) 2) Removals:  
 montelukast 
 tramadol 3) Changes: NONE 4) Pertinent Pharmacy Findings: NONE 
 
 PTA medication list was corrected to the following:  
 
Prior to Admission Medications Prescriptions Last Dose Informant Patient Reported? Taking?  
acetaminophen (TYLENOL) 325 mg tablet 6/30/2018 at Unknown time Self Yes Yes Sig: Take 325 mg by mouth every four (4) hours as needed for Pain. albuterol (PROAIR HFA) 90 mcg/actuation inhaler 7/30/2018 at Unknown time Self Yes Yes Sig: Take 2 Puffs by inhalation every six (6) hours as needed for Wheezing. doxylamine succinate (SLEEP AID, DOXYLAMINE,) 25 mg tablet 7/30/2018 at Unknown time Self Yes Yes Sig: Take 50 mg by mouth nightly as needed for Sleep.  
famotidine (PEPCID PO) 7/30/2018 at Unknown time Self Yes Yes Sig: Take 1 Tab by mouth daily as needed (\"GERD\"). fexofenadine/pseudoephedrine (ALLEGRA-D 12 HOUR PO) 7/29/2018 at Unknown time Self Yes Yes Sig: Take 1 Tab by mouth daily as needed (congestion). Facility-Administered Medications: None Thank you, Yana Simmons 
PharmD. Candidate 2019

## 2018-07-31 NOTE — ED NOTES
Respiratory at bedside to reposition mask and obtain ABGs. Patient removed from mask and still unable to obtain oral temperature. Axillary temperature obtained. Primary RN made aware.

## 2018-07-31 NOTE — PROGRESS NOTES
TRANSFER - IN REPORT: 
 
Verbal report received from José Miguel Lu RN(name) on Pato Garcia  being received from ED(unit) for routine progression of care Report consisted of patients Situation, Background, Assessment and  
Recommendations(SBAR). Information from the following report(s) SBAR was reviewed with the receiving nurse. Opportunity for questions and clarification was provided. Assessment completed upon patients arrival to unit and care assumed.

## 2018-07-31 NOTE — ED PROVIDER NOTES
EMERGENCY DEPARTMENT HISTORY AND PHYSICAL EXAM 
 
 
Date: 7/31/2018 Patient Name: Cholo Soto History of Presenting Illness Chief Complaint Patient presents with  Respiratory Distress Pt unresponsive with agonal respirations at home. O2 sats 30% at home and patient bagged with BVM to 90's. Pt history of asthma History Provided By: Patient and EMS 
 
HPI: Cholo Soto, 57 y.o. female with PMHx significant for asthma, presents via EMS to the ED with concern for evaluation following respiratory distress with complaints of SOB. Per EMS, the pt's  called them this morning after he had a hard time waking the pt up this morning with concerns for unresponsiveness following agonal breathing. EMS informs that the  notes that the pt was complaining of increased SOB since last night with use of her inhaler without alleviation of the symptom. EMS reports upon their arrival to the scene, the pt was with oxygenation levels at 33% prompting concern with administration of Dexmethasone 10 units, Magnesium 2 g, Atrovent 1 unit, 3 rounds of Albuterol treatment, and a BVM with the pt responding well and increasing saturations to 98%. EMS informs that the pt's other vital signs included a BP of 144/86, RR of 28, and a HR of 128. EMS reports no other medications en route to the ED. Pt reports no other medications or notable modifying factors. Pt states improvement in her SOB with EMS arrival. Pt specifically denies any fevers, chills, nausea, vomiting, abdominal pain, leg swelling, palpitations, URI symptoms, or chest pain. PMHx: skin cancer PSHx: hysterectomy Social Hx: + EtOH; - Smoker; - Illicit Drugs PCP: Meryle Motto, MD 
 
There are no other complaints, changes, or physical findings at this time. Current Facility-Administered Medications Medication Dose Route Frequency Provider Last Rate Last Dose  albuterol CONCENTRATE 2.5mg/0.5 mL neb soln  10 mg/hr Nebulization CONTINUOUS William Martinez MD 2 mL/hr at 07/31/18 3679 10 mg/hr at 07/31/18 0017 Current Outpatient Prescriptions Medication Sig Dispense Refill  albuterol (PROAIR HFA) 90 mcg/actuation inhaler Take 2 Puffs by inhalation every six (6) hours as needed for Wheezing.  doxylamine succinate (SLEEP AID, DOXYLAMINE,) 25 mg tablet Take 50 mg by mouth nightly as needed for Sleep.  famotidine (PEPCID PO) Take 1 Tab by mouth daily as needed (\"GERD\").  traMADol (ULTRAM) 50 mg tablet Take 50 mg by mouth every six (6) hours as needed for Pain.  acetaminophen (TYLENOL) 325 mg tablet Take 325 mg by mouth every four (4) hours as needed for Pain.  montelukast (SINGULAIR) 10 mg tablet Take 10 mg by mouth daily as needed (\"Allergies\"). Past History Past Medical History: 
Past Medical History:  
Diagnosis Date  Asthma   
 Skin cancer  Sun-damaged skin  Tanning bed exposure Past Surgical History: 
Past Surgical History:  
Procedure Laterality Date  HX HYSTERECTOMY Family History: 
History reviewed. No pertinent family history. Social History: 
Social History Substance Use Topics  Smoking status: Never Smoker  Smokeless tobacco: Never Used  Alcohol use Yes Comment: socially Allergies: Allergies Allergen Reactions  Augmentin [Amoxicillin-Pot Clavulanate] Hives Review of Systems Review of Systems Constitutional: Negative for chills and fever. HENT: Negative for congestion. Respiratory: Positive for shortness of breath. Negative for cough. Cardiovascular: Negative for chest pain, palpitations and leg swelling. Gastrointestinal: Negative for abdominal pain, nausea and vomiting. Genitourinary: Negative for dysuria. Musculoskeletal: Negative for back pain. Neurological: Negative for headaches. All other systems reviewed and are negative. Physical Exam  
Physical Exam  
Vital signs and nursing notes reviewed CONSTITUTIONAL: Alert, in no moderate distress; well-developed; well-nourished. HEAD:  Normocephalic, atraumatic EYES: PERRL; EOM's intact. ENTM: Nose: no rhinorrhea; Throat: mucous membranes moist; NP airway and R nare slightly bloody; no perioral cyanosis Neck:  Supple. trachea is midline. No JVD RESP: In moderate respiratory distress. Speaking in 3-4 word sentences with increased work of breathing. Inspiratory and expiratory wheezes noted bilaterally. RR- 28 
CV: Tachycardia. S1 and S2 WNL; No murmurs, gallops or rubs. 2+ radial and DP pulses bilaterally. GI: non-distended, normal bowel sounds, abdomen soft and non-tender. No masses or organomegaly. : No costo-vertebral angle tenderness. BACK:  Non-tender, normal appearance UPPER EXT:  Normal inspection. no joint or soft tissue swelling LOWER EXT: No edema, no calf tenderness. NEURO: Alert and oriented x3, 5/5 strength and light touch sensation intact in bilateral upper and lower extremities. SKIN: No rashes; Warm and dry PSYCH: Normal mood, normal affect Diagnostic Study Results Labs - Recent Results (from the past 12 hour(s)) EKG, 12 LEAD, INITIAL Collection Time: 07/31/18  5:58 AM  
Result Value Ref Range Ventricular Rate 125 BPM  
 Atrial Rate 125 BPM  
 P-R Interval 154 ms QRS Duration 68 ms Q-T Interval 308 ms QTC Calculation (Bezet) 444 ms Calculated P Axis 95 degrees Calculated R Axis 45 degrees Calculated T Axis 62 degrees Diagnosis Sinus tachycardia Low voltage QRS Septal infarct , age undetermined When compared with ECG of 16-JUL-2018 12:24, 
Vent. rate has increased BY  45 BPM 
Septal infarct is now present CBC WITH AUTOMATED DIFF Collection Time: 07/31/18  6:36 AM  
Result Value Ref Range WBC 14.0 (H) 3.6 - 11.0 K/uL  
 RBC 4.31 3.80 - 5.20 M/uL  
 HGB 13.9 11.5 - 16.0 g/dL HCT 43.3 35.0 - 47.0 %  .5 (H) 80.0 - 99.0 FL  
 MCH 32.3 26.0 - 34.0 PG  
 MCHC 32.1 30.0 - 36.5 g/dL  
 RDW 12.8 11.5 - 14.5 % PLATELET 967 560 - 980 K/uL MPV 10.2 8.9 - 12.9 FL  
 NRBC 0.0 0  WBC ABSOLUTE NRBC 0.00 0.00 - 0.01 K/uL NEUTROPHILS 70 32 - 75 % LYMPHOCYTES 13 12 - 49 % MONOCYTES 2 (L) 5 - 13 % EOSINOPHILS 14 (H) 0 - 7 % BASOPHILS 1 0 - 1 % IMMATURE GRANULOCYTES 0 0.0 - 0.5 % ABS. NEUTROPHILS 9.8 (H) 1.8 - 8.0 K/UL  
 ABS. LYMPHOCYTES 1.8 0.8 - 3.5 K/UL  
 ABS. MONOCYTES 0.3 0.0 - 1.0 K/UL  
 ABS. EOSINOPHILS 2.0 (H) 0.0 - 0.4 K/UL  
 ABS. BASOPHILS 0.1 0.0 - 0.1 K/UL  
 ABS. IMM. GRANS. 0.0 0.00 - 0.04 K/UL  
 DF MANUAL    
 RBC COMMENTS NORMOCYTIC, NORMOCHROMIC    
VENOUS BLOOD GAS Collection Time: 07/31/18  6:46 AM  
Result Value Ref Range VENOUS PH 7.23 (L) 7.32 - 7.42    
 VENOUS PCO2 57 (H) 41 - 51 mmHg VENOUS PO2 54 (H) 25 - 40 mmHg VENOUS O2 SATURATION 82 65 - 88 % VENOUS BICARBONATE 23 23 - 28 mmol/L  
 VENOUS BASE DEFICIT 5.3 mmol/L  
 O2 METHOD BIPAP    
 FIO2 50 % SET RATE 4    
 SPONTANEOUS RATE 27.0 IPAP/PIP 12.0 EPAP/CPAP/PEEP 6.0 Sample source VENOUS    
 SITE OTHER    
BLOOD GAS, ARTERIAL Collection Time: 07/31/18  8:02 AM  
Result Value Ref Range pH 7.33 (L) 7.35 - 7.45    
 PCO2 39 35.0 - 45.0 mmHg PO2 163 (H) 80 - 100 mmHg O2 SAT 99 (H) 92 - 97 % BICARBONATE 20 (L) 22 - 26 mmol/L  
 BASE DEFICIT 5.0 mmol/L  
 O2 METHOD BIPAP    
 FIO2 50 % MODE BIPAP    
 SPONTANEOUS RATE 25.0 PRESSURE SUPPORT 6.0 IPAP/PIP 12.0 EPAP/CPAP/PEEP 6.0 Sample source ARTERIAL    
 SITE RIGHT BRACHIAL FIFI'S TEST N/A Radiologic Studies -  
XR CHEST PORT Final Result Xr Chest [facility] Result Date: 7/31/2018 IMPRESSION: No evidence of acute cardiopulmonary process. Medical Decision Making I am the first provider for this patient. I reviewed the vital signs, available nursing notes, past medical history, past surgical history, family history and social history.  
 
Vital Signs-Reviewed the patient's vital signs. Patient Vitals for the past 12 hrs: 
 Temp Pulse Resp BP SpO2  
07/31/18 0806 - - - - 98 % 07/31/18 0748 97.3 °F (36.3 °C) (!) 116 22 98/71 98 % 07/31/18 0700 - (!) 119 23 109/67 98 % 07/31/18 0645 - (!) 122 25 113/79 97 % 07/31/18 0630 - (!) 123 25 113/81 98 % 07/31/18 0618 - - - - 97 % 07/31/18 0615 - (!) 125 24 122/79 97 % 07/31/18 6894 - - - - 97 % 07/31/18 0602 - (!) 124 24 (!) 148/92 98 % Pulse Oximetry Analysis - 98% on BiPAP Cardiac Monitor:  
Rate: 124 bpm 
Rhythm: Sinus Tachycardia EKG interpretation: (6379) Rhythm: sinus tachycardia; and regular . Rate (approx.): 125; Axis: normal; MO interval: normal; QRS interval: normal ; ST/T wave: normal; Other findings: low voltage. Written by BRADEN Bronson, as dictated by Lacho Lujan MD. Records Reviewed: Nursing Notes and Old Medical Records Provider Notes (Medical Decision Making):  
56 y/o F with history of asthma resulting in severe hypoxia and hypercarbia today requiring positive pressure ventilation. Pt with recent ED visit with syncopal episode but was having increased work of breathing problems at that time as well. Concern for asthma exacerbation today without evidence of pneumothorax; no clinical history of exam findings consistent with PNA or CHF. Will screen with D-dimer but pt is low risk at baseline. Would likely benefit from Pulmonary consultation given this is her second ED visit with some relation to respiratory status. Plan for admission. ED Course:  
Initial assessment performed. The patients presenting problems have been discussed, and they are in agreement with the care plan formulated and outlined with them. I have encouraged them to ask questions as they arise throughout their visit. Progress Note:  
6:04 AM 
Prior to placement on BiPAP, NP in R nare removed without ongoing active bleeding.   
Written by BRADEN Bronson, as dictated by Lacho Lujan MD. Progress Note:  
6:20 AM 
Pt reevaluated with BiPAP in place. Pt appearing more comfortable. Continuous nebulizer treatments underway. RN at bedside. Written by BRADEN Carcamo, as dictated by Moiz Garza MD.  
 
Progress Note:  
6:50 AM 
Reviewed CXR. EKG shows low voltage but no cardiomegaly visible on CXR. Written by BRADEN Carcamo, as dictated by Moiz Garza MD.  
 
Progress Note:  
7:00 AM 
Pt informs she feels as if her breathing has improved significantly from prior.  at [facility]. Written by BRADEN Carcamo, as dictated by Moiz Garza MD.  
 
CONSULT NOTE:  
8:11 AM 
Moiz Garza MD spoke with Dr. Genesis Guadalupe Specialty: Hospitalist 
Discussed pt's hx, disposition, and available diagnostic and imaging results. Reviewed care plans. Consultant agrees with plans as outlined. Accepts admission and requesting UA. Written by BRADEN Carcamo, as dictated by Moiz Garza MD. 
 
Progress Note:  
8:15 AM 
Updated pt on all returned results and findings including hospitalist consult and likely admission. Pt in agreement with the further progression of care plan and expresses agreement with and understanding of all items discussed. Written by BRADEN Carcamo, as dictated by Moiz Garza MD. Critical Care: 
 
IMPENDING DETERIORATION -Respiratory ASSOCIATED RISK FACTORS - Hypoxia MANAGEMENT- Bedside Assessment and Supervision of Care INTERPRETATION -  Xrays, Blood Gases, ECG, Blood Pressure and Cardiac Output Measures INTERVENTIONS - respiratory management including positive pressure ventilation CASE REVIEW - Hospitalist, Nursing and Family TREATMENT RESPONSE -Stable PERFORMED BY - Self Notes: 
I have spent 45 minutes of critical care time involved in lab review, consultations with specialist, family decision- making, bedside attention and documentation.  During this entire length of time I was immediately available to the patient. This note is prepared by Irene Dempsey, ED Scribe, as dictated by Adrianne Beebe MD 
 
Disposition: 
ADMIT NOTE: 
 
8:11 AM 
Patient is being admitted to the hospital by Dr. Saadia Cosby. The results of their tests and reasons for their admission have been discussed with the patient and/or available family. They convey agreement and understanding for the need to be admitted and for the admission diagnosis. PLAN: 
1. Admit to Hospitalist, Dr. Saadia Cosby Diagnosis Clinical Impression: 1. Acute severe exacerbation of intrinsic asthma 2. Acute respiratory failure with hypoxia and hypercarbia (HCC) 3. Leukocytosis, unspecified type Attestations: This note is prepared by Irene Dempsey, acting as Scribe for Adrianne Beebe MD. Adrianne Beebe MD: The scribe's documentation has been prepared under my direction and personally reviewed by me in its entirety. I confirm that the note above accurately reflects all work, treatment, procedures, and medical decision making performed by me. This note will not be viewable in [address]

## 2018-07-31 NOTE — ED NOTES
Spoke with RT Alina Waters. She will be down shortly for ABG and to help reposition bipap mask for better comfort as patient has a black eye from a previous fall.  Pt given warm blankets and updated on plan of care by MD

## 2018-07-31 NOTE — ED NOTES
TRANSFER - OUT REPORT: 
 
Verbal report given to Mai Vivas RN on Tristan Leon  being transferred to  23  for routine progression of care Report consisted of patients Situation, Background, Assessment and  
Recommendations(SBAR). Information from the following report(s) SBAR, Kardex, ED Summary, MAR, Recent Results and Cardiac Rhythm NSR was reviewed with the receiving nurse. Lines:  
Peripheral IV 07/31/18 Left Antecubital (Active) Site Assessment Clean, dry, & intact 7/31/2018  6:00 AM  
Phlebitis Assessment 0 7/31/2018  6:00 AM  
Infiltration Assessment 0 7/31/2018  6:00 AM  
Dressing Status Clean, dry, & intact 7/31/2018  6:00 AM  
Dressing Type Tape;Transparent 7/31/2018  6:00 AM  
Hub Color/Line Status Green;Patent; Flushed 7/31/2018  6:00 AM  
  
 
Opportunity for questions and clarification was provided. Patient transported with: 
 Monitor

## 2018-07-31 NOTE — H&P
Hospitalist Admission Note NAME: Ana Will :  1955 MRN:  [id number] Date/Time:  2018 8:47 AM 
 
Patient PCP: Mago Salazar MD 
______________________________________________________________________ Given the patient's current clinical presentation, I have a high level of concern for decompensation if discharged from the emergency department. Complex decision making was performed, which includes reviewing the patient's available past medical records, laboratory results, and x-ray films. My assessment of this patient's clinical condition and my plan of care is as follows. Assessment / Plan: 
 
Acute respiratory failure With Hypoxia and Hypercapnia POA Due to Asthma Exacerbation Risk of deterioration, will admit to  stepdown Requiring bipap IV Steroids Frequent schaduled and PRN nebs Hold abx for now as pt has no ncreased sputum volume, or increased sputum purulence. Mucolytics and Antitussive D-Dimer elevated get cta r/o pe CXR clear Will need good oupt f/u Acute renal failure 
-appears to be prerenal state 
-suspect can tolerate IVF 
-repeat creat in AM  If worsen get renal us and renal to see Avoid nephrotoxic drugs Code Status: FULL CODE Surrogate Decision Maker: DVT Prophylaxis: lovenox GI Prophylaxis: not indicated Baseline: ambulatory Subjective: CHIEF COMPLAINT: sob HISTORY OF PRESENT ILLNESS:    
Ana Will is a 57 y.o.  female with PMHx significant for asthma, never intubated or smoker presents via EMS to the ED with concern for evaluation following respiratory distress with complaints of SOB. Per EMSt was complaining of increased SOB since last night with use of her inhaler without alleviation of the symptom. Per pt sx have been getting worse over the past few days.  
EMS reports upon their arrival to the scene, the pt was with oxygenation levels at 33% prompting concern with administration of Dexmethasone 10 units, Magnesium 2 g, Atrovent 1 unit, 3 rounds of Albuterol treatment, and a BVM with the pt responding well and increasing saturations to 98%. Pt reports no other medications or notable modifying factors. Pt states improvement in her SOB with EMS arrival. Pt specifically denies any fevers, chills, nausea, vomiting, abdominal pain, leg swelling, palpitations, URI symptoms, or chest pain. no recent travel  No leg pain. No ill contacts. Does not with pulmonary. 
  
 
 
We were asked to admit for work up and evaluation of the above problems. Past Medical History:  
Diagnosis Date  Asthma   
 Skin cancer  Sun-damaged skin  Tanning bed exposure Past Surgical History:  
Procedure Laterality Date  HX HYSTERECTOMY Social History Substance Use Topics  Smoking status: Never Smoker  Smokeless tobacco: Never Used  Alcohol use Yes Comment: socially History reviewed. No pertinent family history. Allergies Allergen Reactions  Augmentin [Amoxicillin-Pot Clavulanate] Hives Prior to Admission medications Medication Sig Start Date End Date Taking? Authorizing Provider  
albuterol (PROAIR HFA) 90 mcg/actuation inhaler Take 2 Puffs by inhalation every six (6) hours as needed for Wheezing. Yes Historical Provider  
doxylamine succinate (SLEEP AID, DOXYLAMINE,) 25 mg tablet Take 50 mg by mouth nightly as needed for Sleep. Yes Historical Provider  
famotidine (PEPCID PO) Take 1 Tab by mouth daily as needed (\"GERD\"). Yes Historical Provider  
traMADol (ULTRAM) 50 mg tablet Take 50 mg by mouth every six (6) hours as needed for Pain. Historical Provider  
acetaminophen (TYLENOL) 325 mg tablet Take 325 mg by mouth every four (4) hours as needed for Pain. Historical Provider  
montelukast (SINGULAIR) 10 mg tablet Take 10 mg by mouth daily as needed (\"Allergies\"). Historical Provider REVIEW OF SYSTEMS:    
I am not able to complete the review of systems because: The patient is intubated and sedated The patient has altered mental status due to his acute medical problems The patient has baseline aphasia from prior stroke(s) The patient has baseline dementia and is not reliable historian The patient is in acute medical distress and unable to provide information Total of 12 systems reviewed as follows:   
   POSITIVE= underlined text  Negative = text not underlined General:  fever, chills, sweats, generalized weakness, weight loss/gain,  
   loss of appetite Eyes:    blurred vision, eye pain, loss of vision, double vision ENT:    rhinorrhea, pharyngitis Respiratory:   cough, sputum production, SOB, GARCIA, wheezing, pleuritic pain  
Cardiology:   chest pain, palpitations, orthopnea, PND, edema, syncope Gastrointestinal:  abdominal pain , N/V, diarrhea, dysphagia, constipation, bleeding Genitourinary:  frequency, urgency, dysuria, hematuria, incontinence Muskuloskeletal :  arthralgia, myalgia, back pain Hematology:  easy bruising, nose or gum bleeding, lymphadenopathy Dermatological: rash, ulceration, pruritis, color change / jaundice Endocrine:   hot flashes or polydipsia Neurological:  headache, dizziness, confusion, focal weakness, paresthesia, Speech difficulties, memory loss, gait difficulty Psychological: Feelings of anxiety, depression, agitation Objective: VITALS:   
Visit Vitals  BP 96/71 (BP 1 Location: Right arm, BP Patient Position: Sitting)  Pulse (!) 104  Temp 97.3 °F (36.3 °C)  Resp 19  
 Ht 5' 5\" (1.651 m)  Wt 59 kg (130 lb)  SpO2 97%  BMI 21.63 kg/m2 PHYSICAL EXAM: 
 
General:    Alert, cooperative, no distress, appears stated age. Speaking in 3-4 word sentences with increased work of breathing. Inspiratory and expiratory wheezes noted bilaterally. RR- 28 
HEENT: Atraumatic, anicteric sclerae, pink conjunctivae    No oral ulcers, mucosa moist, throat clear, dentition fair Neck:  Supple, symmetrical,  thyroid: non tender Lungs:   Clear to auscultation bilaterally. No Wheezing or Rhonchi. No rales. Chest wall:  No tenderness  No Accessory muscle use. Heart:   tachy  rhythm,  No  murmur   No edema Abdomen:   Soft, non-tender. Not distended. Bowel sounds normal 
Extremities: No cyanosis. No clubbing,   
  Skin turgor normal, Capillary refill normal, Radial dial pulse 2+ Skin:     Not pale. Not Jaundiced  No rashes Psych:  Good insight. Not depressed. Not anxious or agitated. Neurologic: EOMs intact. No facial asymmetry. No aphasia or slurred speech. Symmetrical strength, Sensation grossly intact. Alert and oriented X 4.  
 
_______________________________________________________________________ Care Plan discussed with: 
  Comments Patient x Family  x   
RN x Care Manager Consultant:  x   
_______________________________________________________________________ Expected  Disposition:  
Home with Family x HH/PT/OT/RN   
SNF/LTC   
[facility]KE   
________________________________________________________________________ TOTAL TIME:  60 Minutes Critical Care Provided     Minutes non procedure based Comments  
x x Reviewed previous records  
>50% of visit spent in counseling and coordination of care x Discussion with patient and/or family and questions answered 
  
 
________________________________________________________________________ Signed: Orlin Alcantar MD 
 
Procedures: see electronic medical records for all procedures/Xrays and details which were not copied into this note but were reviewed prior to creation of Plan. LAB DATA REVIEWED:   
Recent Results (from the past 24 hour(s)) EKG, 12 LEAD, INITIAL Collection Time: 07/31/18  5:58 AM  
Result Value Ref Range Ventricular Rate 125 BPM  
 Atrial Rate 125 BPM  
 P-R Interval 154 ms QRS Duration 68 ms Q-T Interval 308 ms  QTC Calculation (Bezet) 444 ms Calculated P Axis 95 degrees Calculated R Axis 45 degrees Calculated T Axis 62 degrees Diagnosis Sinus tachycardia Low voltage QRS Septal infarct , age undetermined When compared with ECG of 16-JUL-2018 12:24, 
Vent. rate has increased BY  45 BPM 
Septal infarct is now present METABOLIC PANEL, COMPREHENSIVE Collection Time: 07/31/18  6:36 AM  
Result Value Ref Range Sodium 140 136 - 145 mmol/L Potassium 3.5 3.5 - 5.1 mmol/L Chloride 108 97 - 108 mmol/L  
 CO2 23 21 - 32 mmol/L Anion gap 9 5 - 15 mmol/L Glucose 270 (H) 65 - 100 mg/dL BUN 9 6 - 20 MG/DL Creatinine 1.21 (H) 0.55 - 1.02 MG/DL  
 BUN/Creatinine ratio 7 (L) 12 - 20 GFR est AA 54 (L) >60 ml/min/1.73m2 GFR est non-AA 45 (L) >60 ml/min/1.73m2 Calcium 7.9 (L) 8.5 - 10.1 MG/DL Bilirubin, total 0.5 0.2 - 1.0 MG/DL  
 ALT (SGPT) 18 12 - 78 U/L  
 AST (SGOT) 27 15 - 37 U/L Alk. phosphatase 64 45 - 117 U/L Protein, total 6.3 (L) 6.4 - 8.2 g/dL Albumin 3.5 3.5 - 5.0 g/dL Globulin 2.8 2.0 - 4.0 g/dL A-G Ratio 1.3 1.1 - 2.2 CK W/ CKMB & INDEX Collection Time: 07/31/18  6:36 AM  
Result Value Ref Range  (H) 26 - 192 U/L  
 CK - MB 2.7 <3.6 NG/ML  
 CK-MB Index 1.2 0 - 2.5    
CBC WITH AUTOMATED DIFF Collection Time: 07/31/18  6:36 AM  
Result Value Ref Range WBC 14.0 (H) 3.6 - 11.0 K/uL  
 RBC 4.31 3.80 - 5.20 M/uL  
 HGB 13.9 11.5 - 16.0 g/dL HCT 43.3 35.0 - 47.0 % .5 (H) 80.0 - 99.0 FL  
 MCH 32.3 26.0 - 34.0 PG  
 MCHC 32.1 30.0 - 36.5 g/dL  
 RDW 12.8 11.5 - 14.5 % PLATELET 222 533 - 276 K/uL MPV 10.2 8.9 - 12.9 FL  
 NRBC 0.0 0  WBC ABSOLUTE NRBC 0.00 0.00 - 0.01 K/uL NEUTROPHILS 70 32 - 75 % LYMPHOCYTES 13 12 - 49 % MONOCYTES 2 (L) 5 - 13 % EOSINOPHILS 14 (H) 0 - 7 % BASOPHILS 1 0 - 1 % IMMATURE GRANULOCYTES 0 0.0 - 0.5 % ABS. NEUTROPHILS 9.8 (H) 1.8 - 8.0 K/UL  
 ABS. LYMPHOCYTES 1.8 0.8 - 3.5 K/UL  
 ABS. MONOCYTES 0.3 0.0 - 1.0 K/UL  
 ABS. EOSINOPHILS 2.0 (H) 0.0 - 0.4 K/UL  
 ABS. BASOPHILS 0.1 0.0 - 0.1 K/UL  
 ABS. IMM. GRANS. 0.0 0.00 - 0.04 K/UL  
 DF MANUAL    
 RBC COMMENTS NORMOCYTIC, NORMOCHROMIC    
TROPONIN I Collection Time: 07/31/18  6:36 AM  
Result Value Ref Range Troponin-I, Qt. <0.05 <0.05 ng/mL NT-PRO BNP Collection Time: 07/31/18  6:36 AM  
Result Value Ref Range NT pro-BNP 89 0 - 125 PG/ML  
VENOUS BLOOD GAS Collection Time: 07/31/18  6:46 AM  
Result Value Ref Range VENOUS PH 7.23 (L) 7.32 - 7.42    
 VENOUS PCO2 57 (H) 41 - 51 mmHg VENOUS PO2 54 (H) 25 - 40 mmHg VENOUS O2 SATURATION 82 65 - 88 % VENOUS BICARBONATE 23 23 - 28 mmol/L  
 VENOUS BASE DEFICIT 5.3 mmol/L  
 O2 METHOD BIPAP    
 FIO2 50 % SET RATE 4    
 SPONTANEOUS RATE 27.0 IPAP/PIP 12.0 EPAP/CPAP/PEEP 6.0 Sample source VENOUS    
 SITE OTHER    
D DIMER Collection Time: 07/31/18  7:30 AM  
Result Value Ref Range D-dimer 0.98 (H) 0.00 - 0.65 mg/L FEU  
BLOOD GAS, ARTERIAL Collection Time: 07/31/18  8:02 AM  
Result Value Ref Range pH 7.33 (L) 7.35 - 7.45    
 PCO2 39 35.0 - 45.0 mmHg PO2 163 (H) 80 - 100 mmHg O2 SAT 99 (H) 92 - 97 % BICARBONATE 20 (L) 22 - 26 mmol/L  
 BASE DEFICIT 5.0 mmol/L  
 O2 METHOD BIPAP    
 FIO2 50 % MODE BIPAP    
 SPONTANEOUS RATE 25.0 PRESSURE SUPPORT 6.0 IPAP/PIP 12.0 EPAP/CPAP/PEEP 6.0 Sample source ARTERIAL    
 SITE RIGHT BRACHIAL  FIFI'S TEST N/A

## 2018-07-31 NOTE — ED NOTES
Assumed care of patient at this time. Per EMS patient woke up out of her sleep with shortness of breath. Pt given albuterol at home by  but symtpoms did not resolve.  called EMS when shortness of breath continued. Pt became unresponsive with agonal respirations. O2 sats 30% per EMS upon arrival. Pt bagged with BVM up to 98% and patient became responsive. EMS placed a nasal airway as well. Pt is alert and oriented and is able to follow all commands. Pt has a history of asthma and does not currently use O2 at home 3 albuterol 1 atrovent 2 g of magnesium 10 dexamethasone Given per EMS

## 2018-07-31 NOTE — ED NOTES
Assumed care of patient. Bedside shift change report [facility] from [facility] (offgoing nurse) by Alicia Johnson RN (oncoming nurse). Given using SBAR, ED summary, MAR and recent results. Pt given pillow and assisted to position of comfort. Will continue to monitor.

## 2018-07-31 NOTE — PROGRESS NOTES
physical Therapy EVALUATION/DISCHARGE Patient: Don Rojas (27 y.o. female) Date: 7/31/2018 Primary Diagnosis: Asthma exacerbation Hypoxia Precautions:     
ASSESSMENT : 
Based on the objective data described below, the patient presents with good balance, strength, and ROM. 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 Assessed mobility on 2L initially and weaned to 1L via nasal canula. SpO2 >92% throughout. Did not assess without O2 d/t elevated HR from 104 resting to 120's during gait and following events that lead to admission this am. No functional mobility deficits identified and she is safe to mobilize with family or nsg. Skilled physical therapy is not indicated at this time. PLAN : 
Discharge Recommendations: None Further Equipment Recommendations for Discharge: None SUBJECTIVE:  
Patient stated I just couldn't catch my breath this morning. I went into the school where is was working and is smelled strongly of mold. I am allergic to mold and I think that is what set this off.  OBJECTIVE DATA SUMMARY:  
HISTORY:   
Past Medical History:  
Diagnosis Date  Asthma   
 Skin cancer  Sun-damaged skin  Tanning bed exposure Past Surgical History:  
Procedure Laterality Date  HX HYSTERECTOMY Prior Level of Function/Home Situation: independent and active Personal factors and/or comorbidities impacting plan of care: EXAMINATION/PRESENTATION/DECISION MAKING:  
Critical Behavior: 
  
  
  
  
Hearing: Auditory Auditory Impairment: None Range Of Motion: 
AROM: Within functional limits Strength:   
Strength: Within functional limits Tone & Sensation:  
Tone: Normal 
  
  
  
  
Sensation: Intact Coordination: 
Coordination: Within functional limits Vision:  
  
Functional Mobility: 
Bed Mobility: 
  
Supine to Sit: Modified independent Transfers: 
Sit to Stand: Modified independent Balance:  
Sitting: Intact Standing: Intact Ambulation/Gait Training: 
Distance (ft): 200 Feet (ft) Assistive Device: Gait belt Ambulation - Level of Assistance: Independent Gait Description (WDL): Exceptions to [facility] Base of Support: Narrowed Speed/Rafaela: Accelerated Physical Therapy Evaluation Charge Determination History Examination Presentation Decision-Making MEDIUM  Complexity : 1-2 comorbidities / personal factors will impact the outcome/ POC  LOW Complexity : 1-2 Standardized tests and measures addressing body structure, function, activity limitation and / or participation in recreation  LOW Complexity : Stable, uncomplicated  LOW Complexity : FOTO score of  Based on the above components, the patient evaluation is determined to be of the following complexity level: LOW Pain: 
Pain Scale 1: Numeric (0 - 10) Pain Intensity 1: 0 Activity Tolerance:  
See above. Patient tachycardic with gait. SpO2 stable Please refer to the flowsheet for vital signs taken during this treatment. After treatment:  
[x]   Patient left in no apparent distress sitting up in chair 
[]   Patient left in no apparent distress in bed 
[x]   Call bell left within reach [x]   Nursing notified 
[]   Caregiver present 
[]   Bed alarm activated COMMUNICATION/EDUCATION:  
Communication/Collaboration: 
[x]   Fall prevention education was provided and the patient/caregiver indicated understanding. []   Patient/family have participated as able and agree with findings and recommendations. []   Patient is unable to participate in plan of care at this time. Findings and recommendations were discussed with: Registered Nurse Thank you for this referral. 
Kodi Davison PT, DPT Time Calculation: 16 mins

## 2018-08-01 LAB
ALBUMIN SERPL-MCNC: 3.5 G/DL (ref 3.5–5)
ALBUMIN/GLOB SERPL: 1.2 {RATIO} (ref 1.1–2.2)
ALP SERPL-CCNC: 62 U/L (ref 45–117)
ALT SERPL-CCNC: 17 U/L (ref 12–78)
ANION GAP SERPL CALC-SCNC: 8 MMOL/L (ref 5–15)
AST SERPL-CCNC: 23 U/L (ref 15–37)
BASOPHILS # BLD: 0 K/UL (ref 0–0.1)
BASOPHILS NFR BLD: 0 % (ref 0–1)
BILIRUB SERPL-MCNC: 0.5 MG/DL (ref 0.2–1)
BUN SERPL-MCNC: 6 MG/DL (ref 6–20)
BUN/CREAT SERPL: 7 (ref 12–20)
CALCIUM SERPL-MCNC: 8.1 MG/DL (ref 8.5–10.1)
CHLORIDE SERPL-SCNC: 111 MMOL/L (ref 97–108)
CO2 SERPL-SCNC: 20 MMOL/L (ref 21–32)
CREAT SERPL-MCNC: 0.83 MG/DL (ref 0.55–1.02)
DIFFERENTIAL METHOD BLD: ABNORMAL
EOSINOPHIL # BLD: 0 K/UL (ref 0–0.4)
EOSINOPHIL NFR BLD: 0 % (ref 0–7)
ERYTHROCYTE [DISTWIDTH] IN BLOOD BY AUTOMATED COUNT: 12.9 % (ref 11.5–14.5)
GLOBULIN SER CALC-MCNC: 3 G/DL (ref 2–4)
GLUCOSE SERPL-MCNC: 129 MG/DL (ref 65–100)
HCT VFR BLD AUTO: 40.6 % (ref 35–47)
HGB BLD-MCNC: 13.1 G/DL (ref 11.5–16)
IMM GRANULOCYTES # BLD: 0.1 K/UL (ref 0–0.04)
IMM GRANULOCYTES NFR BLD AUTO: 1 % (ref 0–0.5)
LYMPHOCYTES # BLD: 0.6 K/UL (ref 0.8–3.5)
LYMPHOCYTES NFR BLD: 6 % (ref 12–49)
MAGNESIUM SERPL-MCNC: 2.5 MG/DL (ref 1.6–2.4)
MCH RBC QN AUTO: 31.3 PG (ref 26–34)
MCHC RBC AUTO-ENTMCNC: 32.3 G/DL (ref 30–36.5)
MCV RBC AUTO: 97.1 FL (ref 80–99)
MONOCYTES # BLD: 0.2 K/UL (ref 0–1)
MONOCYTES NFR BLD: 2 % (ref 5–13)
NEUTS SEG # BLD: 9.9 K/UL (ref 1.8–8)
NEUTS SEG NFR BLD: 91 % (ref 32–75)
NRBC # BLD: 0 K/UL (ref 0–0.01)
NRBC BLD-RTO: 0 PER 100 WBC
PLATELET # BLD AUTO: 280 K/UL (ref 150–400)
PMV BLD AUTO: 10 FL (ref 8.9–12.9)
POTASSIUM SERPL-SCNC: 3.9 MMOL/L (ref 3.5–5.1)
PROT SERPL-MCNC: 6.5 G/DL (ref 6.4–8.2)
RBC # BLD AUTO: 4.18 M/UL (ref 3.8–5.2)
RBC MORPH BLD: ABNORMAL
SODIUM SERPL-SCNC: 139 MMOL/L (ref 136–145)
WBC # BLD AUTO: 10.8 K/UL (ref 3.6–11)

## 2018-08-01 PROCEDURE — 74011000250 HC RX REV CODE- 250: Performed by: INTERNAL MEDICINE

## 2018-08-01 PROCEDURE — 85025 COMPLETE CBC W/AUTO DIFF WBC: CPT | Performed by: INTERNAL MEDICINE

## 2018-08-01 PROCEDURE — 74011250637 HC RX REV CODE- 250/637: Performed by: INTERNAL MEDICINE

## 2018-08-01 PROCEDURE — 74011250636 HC RX REV CODE- 250/636: Performed by: INTERNAL MEDICINE

## 2018-08-01 PROCEDURE — 77030029684 HC NEB SM VOL KT MONA -A

## 2018-08-01 PROCEDURE — 77010033678 HC OXYGEN DAILY

## 2018-08-01 PROCEDURE — 77030018744 HC FLOMTR PEAK FLO -A

## 2018-08-01 PROCEDURE — 65660000000 HC RM CCU STEPDOWN

## 2018-08-01 PROCEDURE — 36415 COLL VENOUS BLD VENIPUNCTURE: CPT | Performed by: INTERNAL MEDICINE

## 2018-08-01 PROCEDURE — 83735 ASSAY OF MAGNESIUM: CPT | Performed by: INTERNAL MEDICINE

## 2018-08-01 PROCEDURE — 80053 COMPREHEN METABOLIC PANEL: CPT | Performed by: INTERNAL MEDICINE

## 2018-08-01 PROCEDURE — 94640 AIRWAY INHALATION TREATMENT: CPT

## 2018-08-01 RX ORDER — FAMOTIDINE 20 MG/1
20 TABLET, FILM COATED ORAL
Status: DISCONTINUED | OUTPATIENT
Start: 2018-08-01 | End: 2018-08-01

## 2018-08-01 RX ORDER — BENZONATATE 100 MG/1
200 CAPSULE ORAL 3 TIMES DAILY
Status: DISCONTINUED | OUTPATIENT
Start: 2018-08-01 | End: 2018-08-02 | Stop reason: HOSPADM

## 2018-08-01 RX ORDER — LEVOFLOXACIN 5 MG/ML
750 INJECTION, SOLUTION INTRAVENOUS EVERY 24 HOURS
Status: DISCONTINUED | OUTPATIENT
Start: 2018-08-01 | End: 2018-08-02 | Stop reason: HOSPADM

## 2018-08-01 RX ORDER — PANTOPRAZOLE SODIUM 40 MG/1
40 TABLET, DELAYED RELEASE ORAL
Status: DISCONTINUED | OUTPATIENT
Start: 2018-08-01 | End: 2018-08-02 | Stop reason: HOSPADM

## 2018-08-01 RX ORDER — IPRATROPIUM BROMIDE AND ALBUTEROL SULFATE 2.5; .5 MG/3ML; MG/3ML
3 SOLUTION RESPIRATORY (INHALATION)
Status: DISCONTINUED | OUTPATIENT
Start: 2018-08-01 | End: 2018-08-02 | Stop reason: HOSPADM

## 2018-08-01 RX ORDER — ALPRAZOLAM 0.25 MG/1
0.25 TABLET ORAL ONCE
Status: COMPLETED | OUTPATIENT
Start: 2018-08-01 | End: 2018-08-01

## 2018-08-01 RX ADMIN — BENZONATATE 200 MG: 100 CAPSULE ORAL at 10:05

## 2018-08-01 RX ADMIN — METHYLPREDNISOLONE SODIUM SUCCINATE 60 MG: 40 INJECTION, POWDER, FOR SOLUTION INTRAMUSCULAR; INTRAVENOUS at 11:54

## 2018-08-01 RX ADMIN — ACETAMINOPHEN 325 MG: 325 TABLET ORAL at 21:06

## 2018-08-01 RX ADMIN — SODIUM CHLORIDE 100 ML/HR: 900 INJECTION, SOLUTION INTRAVENOUS at 05:04

## 2018-08-01 RX ADMIN — METHYLPREDNISOLONE SODIUM SUCCINATE 40 MG: 40 INJECTION, POWDER, FOR SOLUTION INTRAMUSCULAR; INTRAVENOUS at 00:52

## 2018-08-01 RX ADMIN — LEVOFLOXACIN 750 MG: 5 INJECTION, SOLUTION INTRAVENOUS at 10:06

## 2018-08-01 RX ADMIN — Medication 10 ML: at 05:00

## 2018-08-01 RX ADMIN — METHYLPREDNISOLONE SODIUM SUCCINATE 60 MG: 40 INJECTION, POWDER, FOR SOLUTION INTRAMUSCULAR; INTRAVENOUS at 17:34

## 2018-08-01 RX ADMIN — IPRATROPIUM BROMIDE AND ALBUTEROL SULFATE 3 ML: .5; 3 SOLUTION RESPIRATORY (INHALATION) at 10:48

## 2018-08-01 RX ADMIN — IPRATROPIUM BROMIDE AND ALBUTEROL SULFATE 3 ML: .5; 3 SOLUTION RESPIRATORY (INHALATION) at 15:22

## 2018-08-01 RX ADMIN — BENZONATATE 200 MG: 100 CAPSULE ORAL at 21:07

## 2018-08-01 RX ADMIN — GUAIFENESIN 600 MG: 600 TABLET, EXTENDED RELEASE ORAL at 08:16

## 2018-08-01 RX ADMIN — ACETAMINOPHEN 325 MG: 325 TABLET ORAL at 03:24

## 2018-08-01 RX ADMIN — FAMOTIDINE 20 MG: 20 TABLET ORAL at 08:16

## 2018-08-01 RX ADMIN — MELATONIN TAB 3 MG 3 MG: 3 TAB at 21:06

## 2018-08-01 RX ADMIN — FLUTICASONE FUROATE AND VILANTEROL TRIFENATATE 1 PUFF: 100; 25 POWDER RESPIRATORY (INHALATION) at 08:17

## 2018-08-01 RX ADMIN — ALPRAZOLAM 0.25 MG: 0.25 TABLET ORAL at 14:31

## 2018-08-01 RX ADMIN — PANTOPRAZOLE SODIUM 40 MG: 40 TABLET, DELAYED RELEASE ORAL at 17:33

## 2018-08-01 RX ADMIN — ENOXAPARIN SODIUM 40 MG: 40 INJECTION SUBCUTANEOUS at 08:16

## 2018-08-01 RX ADMIN — METHYLPREDNISOLONE SODIUM SUCCINATE 40 MG: 40 INJECTION, POWDER, FOR SOLUTION INTRAMUSCULAR; INTRAVENOUS at 05:01

## 2018-08-01 RX ADMIN — IPRATROPIUM BROMIDE AND ALBUTEROL SULFATE 3 ML: .5; 3 SOLUTION RESPIRATORY (INHALATION) at 19:10

## 2018-08-01 RX ADMIN — BENZONATATE 200 MG: 100 CAPSULE ORAL at 17:33

## 2018-08-01 RX ADMIN — Medication 10 ML: at 14:31

## 2018-08-01 RX ADMIN — GUAIFENESIN 600 MG: 600 TABLET, EXTENDED RELEASE ORAL at 21:06

## 2018-08-01 RX ADMIN — Medication 10 ML: at 21:07

## 2018-08-01 NOTE — PROGRESS NOTES
Hospitalist Progress Note NAME: Gus Mcdaniel :  1955 MRN:  [id number] Assessment / Plan: 
Acute respiratory failure With Hypoxia and Hypercapnia POA Due to Asthma Exacerbation due to acute bronchitis Now off BiPAP Continue PCU monitoring PRN BiPAP Increase IV steroids to 60mg IV Q6H Add Px abx On PRN nebs, I will start Scheduled Nebs Continue Px inhaler started this admission and ask CM for pricing I believe her chronic night time cough has also component of symptomatic GERD as CT with mild to moderate hiatal hernia, will switch H2 Blocker to PPIs Will recommend OP surgery consultation as an OP as will need evaluation once respiratory status better Scheduled Mucolytics and Antitussive CTA without PE but with bronchitis findings Mild TRAM POA Resolved with IVF 
  
Code Status: FULL CODE Surrogate Decision Maker:  
  
DVT Prophylaxis: lovenox GI Prophylaxis: not indicated 
  
Baseline: ambulatory   
   
Subjective: Pt seen and examined at bedside. Cough and short of breath. NAD. Overnight events d/w RN  
CHIEF COMPLAINT: f/u sob 
  
 
Review of Systems: 
Symptom Y/N Comments  Symptom Y/N Comments Fever/Chills n   Chest Pain n   
Poor Appetite    Edema Cough y   Abdominal Pain n   
Sputum    Joint Pain SOB/GARCIA y   Pruritis/Rash Nausea/vomit    Tolerating PT/OT Diarrhea    Tolerating Diet y Constipation    Other Could NOT obtain due to:   
 
Objective: VITALS:  
Last 24hrs VS reviewed since prior progress note. Most recent are: 
Patient Vitals for the past 24 hrs: 
 Temp Pulse Resp BP SpO2  
18 1052 97.6 °F (36.4 °C) 93 - 118/72 98 % 18 1048 - - - - 97 % 18 0733 98.1 °F (36.7 °C) 88 18 114/75 92 % 18 0250 97.9 °F (36.6 °C) 84 16 125/86 91 %  
18 2301 97.9 °F (36.6 °C) 86 16 113/76 97 % 18 1917 97.7 °F (36.5 °C) (!) 109 18 114/85 95 % Intake/Output Summary (Last 24 hours) at 18 [address] filed at 08/01/18 2372 Gross per 24 hour Intake          2673.33 ml Output                0 ml Net          2673.33 ml PHYSICAL EXAM: 
General: WD, WN. Alert, cooperative, no acute distress   
EENT:  EOMI. Anicteric sclerae. MMM Resp:  wheezing. No accessory muscle use CV:  Regular  rhythm,  No edema GI:  Soft, Non distended, Non tender.  +Bowel sounds Neurologic:  Alert and oriented X 3, normal speech, Psych:   Good insight. Not anxious nor agitated Skin:  No rashes. No jaundice Reviewed most current lab test results and cultures  YES Reviewed most current radiology test results   YES Review and summation of old records today    NO Reviewed patient's current orders and MAR    YES 
PMH/SH reviewed - no change compared to H&P 
________________________________________________________________________ Care Plan discussed with: 
  Comments Patient y Family RN y   
Care Manager Consultant Multidiciplinary team rounds were held today with , nursing, pharmacist and clinical coordinator. Patient's plan of care was discussed; medications were reviewed and discharge planning was addressed. ________________________________________________________________________ Total NON critical care TIME:  35 Minutes Total CRITICAL CARE TIME Spent:   Minutes non procedure based Comments >50% of visit spent in counseling and coordination of care    
________________________________________________________________________ Lalo Kay MD  
 
Procedures: see electronic medical records for all procedures/Xrays and details which were not copied into this note but were reviewed prior to creation of Plan. LABS: 
I reviewed today's most current labs and imaging studies. Pertinent labs include: 
Recent Labs 08/01/18 
 0050  07/31/18 
 1861 WBC  10.8  14.0* HGB  13.1  13.9 HCT  40.6  43.3 PLT  280  280 Recent Labs 08/01/18 
 0050  07/31/18 
 8099 NA  139  140  
K  3.9  3.5 CL  111*  108 CO2  20*  23 GLU  129*  270* BUN  6  9 CREA  0.83  1.21* CA  8.1*  7.9*  
MG  2.5*   --   
ALB  3.5  3.5 TBILI  0.5  0.5 SGOT  23  27 ALT  17  18 Signed: Iraj Velasco MD

## 2018-08-01 NOTE — PROGRESS NOTES
9:44AM 
Readmission Assessment: 
CM met with pt to complete assessment and discuss d/c planning. Pt is a 56 yo female admitted for asthma exacerbation and hypoxia. Pt was previously admitted at [facility] 7/16-7/17 for syncope. Pt confirmed that none of her demographic information has changed since her previous admission. Pt lives with her  in a 1 story home with 5 steps to get inside. At baseline, pt is independent with ADLs and IADLs, including driving. Pt works for the [employer]. Pt expresses no d/c concerns at this time. Pt has f/u cardiology appt scheduled for next week. PT/OT ordered IP.  
 
10:35AM 
CM consult for Rx pricing noted. PC to pt's pharmacy, [facility] in [place]. Geoffrey Ureña stated that medication is $218.16/mo with insurance. CM relayed information to pt. CM will continue to follow and assist with d/c planning. Care Management Interventions PCP Verified by CM: Yes (Kel Keating MD) Mode of Transport at Discharge: Other (see comment) (Pt's spouse ) Transition of Care Consult (CM Consult): Discharge Planning Discharge Durable Medical Equipment: No 
Physical Therapy Consult: Yes Occupational Therapy Consult: Yes Speech Therapy Consult: No 
Current Support Network: Lives with Spouse, Own Home Confirm Follow Up Transport: Self Plan discussed with Pt/Family/Caregiver: Yes Discharge Location Discharge Placement: Home with family assistance [employer] Care Manager 
 Reason for Readmission:     Asthma exacerbation RRAT Score and Risk Level:     4/Low Level of Readmission:    1 Care Conference scheduled:    
    
Resources/supports as identified by patient/family:   Family support Top Challenges facing patient (as identified by patient/family and CM): Finances/Medication cost?     No needs identified Transportation      No needs identified Support system or lack thereof? Family support Living arrangements? With spouse Self-care/ADLs/Cognition? Independent/Oriented Current Advanced Directive/Advance Care Plan:  None/Full code Plan for utilizing home health:   None Likelihood of additional readmission:   Low Transition of Care Plan:    Home with spouse

## 2018-08-01 NOTE — PROGRESS NOTES
Bedside shift change report given to Kimberly Koch (oncoming nurse) by Nisha Verduzco (offgoing nurse). Report included the following information SBAR, Kardex, ED Summary, Procedure Summary, Intake/Output, MAR, Recent Results and Cardiac Rhythm NSR.  
 
1415- Pt found out here mom is in ICU at [facility]. Pt asking for something to help with nerves. Spoke with Dr. Segundo Gann. Order received for PO Xanax. See MAR 
 
1630- Pt ambulated down hallway with . Tolerated activity well. 1900- Bedside shift change report given to Nisha Verduzco (oncoming nurse) by Kimberly Koch (offgoing nurse). Report included the following information SBAR, Kardex, Procedure Summary, Intake/Output, MAR, Recent Results and Cardiac Rhythm NSR.

## 2018-08-02 VITALS
WEIGHT: 130 LBS | OXYGEN SATURATION: 95 % | HEART RATE: 99 BPM | SYSTOLIC BLOOD PRESSURE: 129 MMHG | TEMPERATURE: 98.1 F | HEIGHT: 65 IN | BODY MASS INDEX: 21.66 KG/M2 | DIASTOLIC BLOOD PRESSURE: 83 MMHG | RESPIRATION RATE: 18 BRPM

## 2018-08-02 PROBLEM — F41.9 ANXIETY: Status: ACTIVE | Noted: 2018-08-02

## 2018-08-02 PROCEDURE — 74011250637 HC RX REV CODE- 250/637: Performed by: INTERNAL MEDICINE

## 2018-08-02 PROCEDURE — 74011250636 HC RX REV CODE- 250/636: Performed by: INTERNAL MEDICINE

## 2018-08-02 PROCEDURE — 94640 AIRWAY INHALATION TREATMENT: CPT

## 2018-08-02 PROCEDURE — 74011000250 HC RX REV CODE- 250: Performed by: INTERNAL MEDICINE

## 2018-08-02 RX ORDER — MONTELUKAST SODIUM 10 MG/1
10 TABLET ORAL
Qty: 30 TAB | Refills: 0 | Status: SHIPPED | OUTPATIENT
Start: 2018-08-02

## 2018-08-02 RX ORDER — FLUTICASONE FUROATE AND VILANTEROL 100; 25 UG/1; UG/1
1 POWDER RESPIRATORY (INHALATION) DAILY
Qty: 1 INHALER | Refills: 0 | Status: SHIPPED | OUTPATIENT
Start: 2018-08-03

## 2018-08-02 RX ORDER — ALPRAZOLAM 0.25 MG/1
0.25 TABLET ORAL
Qty: 7 TAB | Refills: 0 | Status: SHIPPED | OUTPATIENT
Start: 2018-08-02

## 2018-08-02 RX ORDER — NEBULIZER AND COMPRESSOR
EACH MISCELLANEOUS
Qty: 1 EACH | Refills: 0 | Status: SHIPPED | OUTPATIENT
Start: 2018-08-02

## 2018-08-02 RX ORDER — PREDNISONE 10 MG/1
TABLET ORAL
Qty: 30 TAB | Refills: 0 | Status: SHIPPED | OUTPATIENT
Start: 2018-08-02

## 2018-08-02 RX ORDER — OMEPRAZOLE 20 MG/1
20 CAPSULE, DELAYED RELEASE ORAL
Qty: 60 CAP | Refills: 0 | Status: SHIPPED | OUTPATIENT
Start: 2018-08-02

## 2018-08-02 RX ORDER — GUAIFENESIN 600 MG/1
TABLET, EXTENDED RELEASE ORAL
Qty: 30 TAB | Refills: 0 | Status: SHIPPED | OUTPATIENT
Start: 2018-08-02

## 2018-08-02 RX ORDER — BENZONATATE 200 MG/1
CAPSULE ORAL
Qty: 30 CAP | Refills: 0 | Status: SHIPPED | OUTPATIENT
Start: 2018-08-02

## 2018-08-02 RX ORDER — IPRATROPIUM BROMIDE AND ALBUTEROL SULFATE 2.5; .5 MG/3ML; MG/3ML
3 SOLUTION RESPIRATORY (INHALATION)
Qty: 100 NEBULE | Refills: 0 | Status: SHIPPED | OUTPATIENT
Start: 2018-08-02

## 2018-08-02 RX ORDER — LEVOFLOXACIN 750 MG/1
750 TABLET ORAL DAILY
Qty: 5 TAB | Refills: 0 | Status: SHIPPED | OUTPATIENT
Start: 2018-08-02 | End: 2018-08-07

## 2018-08-02 RX ADMIN — METHYLPREDNISOLONE SODIUM SUCCINATE 60 MG: 40 INJECTION, POWDER, FOR SOLUTION INTRAMUSCULAR; INTRAVENOUS at 05:19

## 2018-08-02 RX ADMIN — METHYLPREDNISOLONE SODIUM SUCCINATE 60 MG: 40 INJECTION, POWDER, FOR SOLUTION INTRAMUSCULAR; INTRAVENOUS at 12:21

## 2018-08-02 RX ADMIN — GUAIFENESIN 600 MG: 600 TABLET, EXTENDED RELEASE ORAL at 08:51

## 2018-08-02 RX ADMIN — BENZONATATE 200 MG: 100 CAPSULE ORAL at 08:51

## 2018-08-02 RX ADMIN — Medication 10 ML: at 05:20

## 2018-08-02 RX ADMIN — PANTOPRAZOLE SODIUM 40 MG: 40 TABLET, DELAYED RELEASE ORAL at 08:51

## 2018-08-02 RX ADMIN — IPRATROPIUM BROMIDE AND ALBUTEROL SULFATE 3 ML: .5; 3 SOLUTION RESPIRATORY (INHALATION) at 07:37

## 2018-08-02 RX ADMIN — FLUTICASONE FUROATE AND VILANTEROL TRIFENATATE 1 PUFF: 100; 25 POWDER RESPIRATORY (INHALATION) at 08:53

## 2018-08-02 RX ADMIN — LEVOFLOXACIN 750 MG: 5 INJECTION, SOLUTION INTRAVENOUS at 10:48

## 2018-08-02 RX ADMIN — IPRATROPIUM BROMIDE AND ALBUTEROL SULFATE 3 ML: .5; 3 SOLUTION RESPIRATORY (INHALATION) at 11:34

## 2018-08-02 RX ADMIN — METHYLPREDNISOLONE SODIUM SUCCINATE 60 MG: 40 INJECTION, POWDER, FOR SOLUTION INTRAMUSCULAR; INTRAVENOUS at 00:44

## 2018-08-02 NOTE — PROGRESS NOTES
PCP JEREMY appt scheduled with  on 8/7/2018 at 10:00am. Appt added to AVS. Jayne Marte, [address] [facility] Special[address]

## 2018-08-02 NOTE — PROGRESS NOTES
Bedside shift change report given to Jono Laguna (oncoming nurse) by Mark Anthony Garcia (offgoing nurse). Report included the following information SBAR, Kardex, Intake/Output, MAR, Recent Results and Cardiac Rhythm NSR.  
 
1400- Discharge instructions reviewed with pt and . Opportunity for questions provided. Pt taken to front entrance via wheelchair for DC.

## 2018-08-02 NOTE — PROGRESS NOTES
11: 20AM 
D/c order acknowledged by CM. PCP JEREMY appt scheduled and on AVS. CM made pt aware of inhaler pricing. Provider giving pt Rx for nebulizer with plan for pt to get from [facility]. Pt to d/c home with family. Pt ready for d/c from CM perspective. CM available for any additional needs. Veronica Randall, MSW Care Manager

## 2018-08-02 NOTE — PROGRESS NOTES
ADULT PROTOCOL: JET AEROSOL ASSESSMENT Patient  Ramona Che     57 y.o.   female     8/2/2018  10:30 AM 
 
Breath Sounds Pre Procedure: Diminished Post Procedure:  Diminished Breathing pattern: Pre procedure Breathing Pattern: Regular Post procedure Breathing Pattern: Regular Heart Rate: Pre procedure Pulse: 90 
         Post procedure Pulse: 96 
 
Resp Rate: Pre procedure Respirations: 18 Post procedure Respirations: 18 Peak Flow: Pre bronchodilator  Peak Flow: 240 Post bronchodilator  Peak Flow: 250 Cough: Pre procedure Cough: Non-productive Post procedure Cough: Non-productive Oxygen: O2 Device: Room air SpO2: Pre procedure SpO2: 95 % Post procedure SpO2: 96 % Nebulizer Therapy: Current medications Aerosolized Medications: DuoNeb Smoking History:  Never Problem List:  
 
Patient Active Problem List  
Diagnosis Code  Syncope R55  Hypoxia R09.02  
 Asthma exacerbation J45. Viru 63 Respiratory Therapist: RT Jules

## 2018-08-02 NOTE — DISCHARGE INSTRUCTIONS
HOSPITALIST DISCHARGE INSTRUCTIONS    NAME: Sina Howell   :  1955   MRN:  [id number]     Date/Time:  2018 11:11 AM    ADMIT DATE: 2018     DISCHARGE DATE: 2018     DISCHARGE DIAGNOSIS:  Acute respiratory failure With Hypoxia and Hypercapnia POA  Asthma Exacerbation due to acute bronchitis  GERD  Hiatal hernia  Mild TRAM     MEDICATIONS:  · It is important that you take the medication exactly as they are prescribed. · Keep your medication in the bottles provided by the pharmacist and keep a list of the medication names, dosages, and times to be taken in your wallet. · Do not take other medications without consulting your doctor. Pain Management: per above medications    What to do at Home    Recommended diet:  Resume previous diet    Recommended activity: Activity as tolerated    If you have questions regarding the hospital related prescriptions or hospital related issues please call [facility] at [phone]. If you experience any of the following symptoms then please call your primary care physician or return to the emergency room if you cannot get hold of your doctor:  Fever, chills, nausea, vomiting, diarrhea, change in mentation, falling, bleeding, shortness of breath    Follow Up:  Dr. Sirena Bermudez MD  you are to call and set up an appointment to see them in 7-10 days. Information obtained by :  I understand that if any problems occur once I am at home I am to contact my physician. I understand and acknowledge receipt of the instructions indicated above.                                                                                                                                            Physician's or R.N.'s Signature                                                                  Date/Time Patient or Representative Signature                                                          Date/Time

## 2018-08-02 NOTE — DISCHARGE SUMMARY
Hospitalist Discharge Summary     Patient ID:  Ramona Che  [id number]  76 y.o.  1955    PCP on record: Lanie Evans MD    Admit date: 7/31/2018  Discharge date and time: 8/2/2018      DISCHARGE DIAGNOSIS:  Acute respiratory failure With Hypoxia and Hypercapnia POA  Asthma Exacerbation due to acute bronchitis  GERD  Hiatal hernia  Mild TRAM     CONSULTATIONS:  None    Excerpted HPI from H&P of Pro Moeller MD:  Ramona Che is a 57 y.o.  female with PMHx significant for asthma, never intubated or smoker presents via EMS to the ED with concern for evaluation following respiratory distress with complaints of SOB. Per EMSt was complaining of increased SOB since last night with use of her inhaler without alleviation of the symptom. Per pt sx have been getting worse over the past few days. EMS reports upon their arrival to the scene, the pt was with oxygenation levels at 33% prompting concern with administration of Dexmethasone 10 units, Magnesium 2 g, Atrovent 1 unit, 3 rounds of Albuterol treatment, and a BVM with the pt responding well and increasing saturations to 98%. Pt reports no other medications or notable modifying factors. Pt states improvement in her SOB with EMS arrival. Pt specifically denies any fevers, chills, nausea, vomiting, abdominal pain, leg swelling, palpitations, URI symptoms, or chest pain. no recent travel  No leg pain. No ill contacts. Does not with pulmonary.       We were asked to admit for work up and evaluation of the above problems. ______________________________________________________________________  DISCHARGE SUMMARY/HOSPITAL COURSE:  for full details see H&P, daily progress notes, labs, consult notes.      Acute respiratory failure With Hypoxia and Hypercapnia POA  Due to Asthma Exacerbation due to acute bronchitis  Was plan on BiPAP on admission but taken on later same day  Didn't require O2  Wide AAAx3  Taper steroids  Added Breo and Singulair  Px abx  Nebs  I believe her chronic night time cough has also component of symptomatic GERD as CT with mild to moderate hiatal hernia > PPIs   Will recommend OP surgery consultation as an OP as will need evaluation once respiratory status better  Scheduled Mucolytics and Antitussive  CTA without PE but with bronchitis findings     Mild TRAM POA  Resolved with IVF    Anxiety due from family situation, family member in ICU  PRN low dose xanax for few days to get through the phase. Pt explained risk with her respiratory condition and recommended only use if really needed  _______________________________________________________________________  Patient seen and examined by me on discharge day. Pertinent Findings:  Gen:    Not in distress  Chest: Clear lungs  CVS:   Regular rhythm. No edema  Abd:  Soft, not distended, not tender  Neuro:  Alert, non focal  _______________________________________________________________________  DISCHARGE MEDICATIONS:   Current Discharge Medication List      START taking these medications    Details   ALPRAZolam (XANAX) 0.25 mg tablet Take 1 Tab by mouth daily as needed for Anxiety. Indications: anxiety  Qty: 7 Tab, Refills: 0    Associated Diagnoses: Anxiety      montelukast (SINGULAIR) 10 mg tablet Take 1 Tab by mouth nightly. Qty: 30 Tab, Refills: 0      benzonatate (TESSALON) 200 mg capsule Every 8 hours for 7 days then every 8 hours as needed  Qty: 30 Cap, Refills: 0      fluticasone-vilanterol (BREO ELLIPTA) 100-25 mcg/dose inhaler Take 1 Puff by inhalation daily. Qty: 1 Inhaler, Refills: 0      Nebulizer & Compressor machine To use nebs as directed  Qty: 1 Each, Refills: 0      guaiFENesin ER (MUCINEX) 600 mg ER tablet Every 12 hours for 7 days then every 12 hours as needed for congestion  Qty: 30 Tab, Refills: 0      levoFLOXacin (LEVAQUIN) 750 mg tablet Take 1 Tab by mouth daily for 5 days.   Qty: 5 Tab, Refills: 0      predniSONE (DELTASONE) 10 mg tablet 5 tabs daily for 2 days  4 tabs daily for 2 days   3 tabs daily for 2 days   2 tabs daily for 2 days   1 tab   daily for 2 days  Qty: 30 Tab, Refills: 0      omeprazole (PRILOSEC) 20 mg capsule Take 1 Cap by mouth Before breakfast and dinner. Qty: 60 Cap, Refills: 0         CONTINUE these medications which have NOT CHANGED    Details   fexofenadine/pseudoephedrine (ALLEGRA-D 12 HOUR PO) Take 1 Tab by mouth daily as needed (congestion). albuterol (PROAIR HFA) 90 mcg/actuation inhaler Take 2 Puffs by inhalation every six (6) hours as needed for Wheezing. doxylamine succinate (SLEEP AID, DOXYLAMINE,) 25 mg tablet Take 50 mg by mouth nightly as needed for Sleep.      acetaminophen (TYLENOL) 325 mg tablet Take 325 mg by mouth every four (4) hours as needed for Pain. STOP taking these medications       famotidine (PEPCID PO) Comments:   Reason for Stopping:         traMADol (ULTRAM) 50 mg tablet Comments:   Reason for Stopping:               My Recommended Diet, Activity, Wound Care, and follow-up labs are listed in the patient's Discharge Insturctions which I have personally completed and reviewed.     ______________________________________________________________________    Risk of deterioration: Low    Condition at Discharge:  Stable  ______________________________________________________________________    Disposition  Home with family, no needs  ______________________________________________________________________    Care Plan discussed with:   Patient, RN    ______________________________________________________________________    Code Status: Full Code  ______________________________________________________________________      Follow up with:   PCP : [address] MD  Follow-up Information     Follow up With Details Comments [address] [place] [address] on 8/7/2018 Hospital follow-up scheduled at 10:00am ( If you have questions or need to reschedule please call [phone] Jadon Sandhu [facility] [address] [place] [address]  [phone]    Sergei Osullivan MD Schedule an appointment as soon as possible for a visit in 2 weeks for hiatal hernia on CT scan [address] [address] [address]  [address] [phone]                Total time in minutes spent coordinating this discharge (includes going over instructions, follow-up, prescriptions, and preparing report for sign off to her PCP) :  35 minutes    Signed:  Joslyn Chanel MD

## 2018-08-09 ENCOUNTER — OFFICE VISIT (OUTPATIENT)
Dept: CARDIOLOGY CLINIC | Age: 63
End: 2018-08-09

## 2018-08-09 VITALS
RESPIRATION RATE: 16 BRPM | HEIGHT: 65 IN | HEART RATE: 90 BPM | DIASTOLIC BLOOD PRESSURE: 80 MMHG | SYSTOLIC BLOOD PRESSURE: 110 MMHG | OXYGEN SATURATION: 97 % | WEIGHT: 141 LBS | BODY MASS INDEX: 23.49 KG/M2

## 2018-08-09 DIAGNOSIS — J45.21 EXACERBATION OF INTERMITTENT ASTHMA, UNSPECIFIED ASTHMA SEVERITY: Primary | ICD-10-CM

## 2018-08-09 DIAGNOSIS — R55 SYNCOPE, UNSPECIFIED SYNCOPE TYPE: ICD-10-CM

## 2018-08-09 NOTE — PROGRESS NOTES
Titus Scheuermann DNP, ANP-BC  Subjective/HPI:     Louise Pratt is a 57 y.o. female is here for hospital follow-up admitted for syncopal episode suspected secondary to orthostasis, dehydration excessive heat. She was then admitted for acute exacerbation of asthma and discharged. Inpatient workup shows negative nuclear stress test, no arrhythmias detected on monitoring. Echocardiogram normal.    Hospital Course: 1. Syncope:unclear cause    -possibly orthostatic changes vs cardiac event.   -Admitted to telemetry.   -Orthostatics not orthostatic   -No cardiac event reported on telemetry.   -EKG nl   -Cardiac enzymes wnl   -NM Card stress:no ischemia demonstrated. No infarct visible. LVEF 61%. -ECHO c/w EF 55-60%,without abnormality   -No recurrence of symptoms since admission.   -Clinically stable for discharge.      2. TRAM due to dehydration   -Creatinine 1.27 POA   -Resolved,creat 0.98 today      3. Hypokalemia - K 3.4   -Kcl 40 meq x 1 dose      4. Fall   -Fall precautions     5.Christianne-orbital hematoma   -Resolving      6. Asthma.  Condition is stable. On Singulair and albuterol inhalers     PCP Provider  Manav Patel MD  Past Medical History:   Diagnosis Date    Asthma     Skin cancer     Sun-damaged skin     Tanning bed exposure       Past Surgical History:   Procedure Laterality Date    HX HYSTERECTOMY       Allergies   Allergen Reactions    Augmentin [Amoxicillin-Pot Clavulanate] Hives      History reviewed. No pertinent family history. Current Outpatient Prescriptions   Medication Sig    ALPRAZolam (XANAX) 0.25 mg tablet Take 1 Tab by mouth daily as needed for Anxiety. Indications: anxiety    benzonatate (TESSALON) 200 mg capsule Every 8 hours for 7 days then every 8 hours as needed    fluticasone-vilanterol (BREO ELLIPTA) 100-25 mcg/dose inhaler Take 1 Puff by inhalation daily.     Nebulizer & Compressor machine To use nebs as directed    guaiFENesin ER (MUCINEX) 600 mg ER tablet Every 12 hours for 7 days then every 12 hours as needed for congestion    predniSONE (DELTASONE) 10 mg tablet 5 tabs daily for 2 days  4 tabs daily for 2 days   3 tabs daily for 2 days   2 tabs daily for 2 days   1 tab   daily for 2 days    omeprazole (PRILOSEC) 20 mg capsule Take 1 Cap by mouth Before breakfast and dinner.  albuterol-ipratropium (DUO-NEB) 2.5 mg-0.5 mg/3 ml nebu 3 mL by Nebulization route every four (4) hours as needed.  albuterol (PROAIR HFA) 90 mcg/actuation inhaler Take 2 Puffs by inhalation every six (6) hours as needed for Wheezing.  doxylamine succinate (SLEEP AID, DOXYLAMINE,) 25 mg tablet Take 50 mg by mouth nightly as needed for Sleep.  acetaminophen (TYLENOL) 325 mg tablet Take 325 mg by mouth every four (4) hours as needed for Pain.  montelukast (SINGULAIR) 10 mg tablet Take 1 Tab by mouth nightly. (Patient not taking: Reported on 8/9/2018)    fexofenadine/pseudoephedrine (ALLEGRA-D 12 HOUR PO) Take 1 Tab by mouth daily as needed (congestion). No current facility-administered medications for this visit. Vitals:    08/09/18 1320 08/09/18 1321   BP: 110/70 110/80   Pulse: 90    Resp: 16    SpO2: 97%    Weight: 141 lb (64 kg)    Height: 5' 5\" (1.651 m)      Social History     Social History    Marital status:      Spouse name: N/A    Number of children: N/A    Years of education: N/A     Occupational History    Not on file. Social History Main Topics    Smoking status: Never Smoker    Smokeless tobacco: Never Used    Alcohol use Yes      Comment: socially    Drug use: No    Sexual activity: Yes     Birth control/ protection: Surgical     Other Topics Concern    Not on file     Social History Narrative       I have reviewed the nurses notes, vitals, problem list, allergy list, medical history, family, social history and medications. Review of Symptoms:    General: Pt denies excessive weight gain or loss.  Pt is able to conduct ADL's  HEENT: Denies blurred vision, headaches, epistaxis and difficulty swallowing. Respiratory: Denies shortness of breath, GARCIA, wheezing or stridor. Cardiovascular: Denies precordial pain, palpitations, edema or PND  Gastrointestinal: Denies poor appetite, indigestion, abdominal pain or blood in stool  Musculoskeletal: Denies pain or swelling from muscles or joints  Neurologic: Denies tremor, paresthesias, or sensory motor disturbance  Skin: Denies rash, itching or texture change. Physical Exam:      General: Well developed, in no acute distress, cooperative and alert  HEENT: No carotid bruits, no JVD, trach is midline. Neck Supple, PEERL, EOM intact. Heart:  Normal S1/S2 negative S3 or S4. Regular, no murmur, gallop or rub.   Respiratory: Clear bilaterally x 4, no wheezing or rales  Abdomen:   Soft, non-tender, no masses, bowel sounds are active.   Extremities:  No edema, normal cap refill, no cyanosis, atraumatic. Neuro: A&Ox3, speech clear, gait stable. Skin: Skin color is normal. No rashes or lesions. Non diaphoretic  Vascular: 2+ pulses symmetric in all extremities    Cardiographics    ECG: Normal sinus rhythm  Results for orders placed or performed during the hospital encounter of 07/31/18   EKG, 12 LEAD, INITIAL   Result Value Ref Range    Ventricular Rate 125 BPM    Atrial Rate 125 BPM    P-R Interval 154 ms    QRS Duration 68 ms    Q-T Interval 308 ms    QTC Calculation (Bezet) 444 ms    Calculated P Axis 95 degrees    Calculated R Axis 45 degrees    Calculated T Axis 62 degrees    Diagnosis       Poor data quality  Probable Sinus tachycardia  Low voltage QRS  Loss of anteroseptal forces  Nonspecific ST and T wave abnormality  When compared with ECG of 16-JUL-2018 12:24,  Vent.  rate has increased BY  45 BPM    Confirmed by Unbabel Brain ([id number]) on 7/31/2018 11:19:24 AM           Cardiology Labs:  No results found for: CHOL, CHOLX, CHLST, [address], [address], HDL, LDL, LDLC, DLDLP, TGLX, TRIGL, TRIGP, CHHD, CHHDX    Lab Results Component Value Date/Time    Sodium 139 08/01/2018 12:50 AM    Potassium 3.9 08/01/2018 12:50 AM    Chloride 111 (H) 08/01/2018 12:50 AM    CO2 20 (L) 08/01/2018 12:50 AM    Anion gap 8 08/01/2018 12:50 AM    Glucose 129 (H) 08/01/2018 12:50 AM    BUN 6 08/01/2018 12:50 AM    Creatinine 0.83 08/01/2018 12:50 AM    BUN/Creatinine ratio 7 (L) 08/01/2018 12:50 AM    GFR est AA >60 08/01/2018 12:50 AM    GFR est non-AA >60 08/01/2018 12:50 AM    Calcium 8.1 (L) 08/01/2018 12:50 AM    Bilirubin, total 0.5 08/01/2018 12:50 AM    AST (SGOT) 23 08/01/2018 12:50 AM    Alk. phosphatase 62 08/01/2018 12:50 AM    Protein, total 6.5 08/01/2018 12:50 AM    Albumin 3.5 08/01/2018 12:50 AM    Globulin 3.0 08/01/2018 12:50 AM    A-G Ratio 1.2 08/01/2018 12:50 AM    ALT (SGPT) 17 08/01/2018 12:50 AM           Assessment:     Assessment:     Diagnoses and all orders for this visit:    1. Exacerbation of intermittent asthma, unspecified asthma severity  -     AMB POC EKG ROUTINE W/ 12 LEADS, INTER & REP    2. Syncope, unspecified syncope type        ICD-10-CM ICD-9-CM    1. Exacerbation of intermittent asthma, unspecified asthma severity J45.21 493.92 AMB POC EKG ROUTINE W/ 12 LEADS, INTER & REP   2. Syncope, unspecified syncope type R55 780.2      Orders Placed This Encounter    AMB POC EKG ROUTINE W/ 12 LEADS, INTER & REP     Order Specific Question:   Reason for Exam:     Answer:   routine        Plan:     Patient is a 15-year-old female here for hospital follow-up from syncopal episode of unknown cause negative cardiac workup regarding nuclear stress test, telemetry monitoring and echocardiogram.  She was then subsequently admitted for acute asthma exacerbation. She is stable from a cardiac standpoint, no further workup from cardiology as needed unless she suffers another syncopal episode and will require internal loop recorder.   She has a surgical consult pending for hernia repair she is cleared from cardiology to proceed  Follow-up with cardiology as needed    Larence Heimlich, NP    This note was created using voice recognition software. Despite editing, there may be syntax errors. Pt seen and examined in details. Agree with NP A&P. D/w pt.      Lia Mcdonough MD

## 2018-08-09 NOTE — PROGRESS NOTES
1. Have you been to the ER, urgent care clinic since your last visit? Hospitalized since your last visit? Yes When: 7/2018    2. Have you seen or consulted any other health care providers outside of the [address] [place] [address] since your last visit? Include any pap smears or colon screening. No    Patient has been improving with her oxygen has appt with Dr Gilman Sever to discuss surgery next week.

## 2018-08-13 ENCOUNTER — OFFICE VISIT (OUTPATIENT)
Dept: SURGERY | Age: 63
End: 2018-08-13

## 2018-08-13 VITALS
OXYGEN SATURATION: 97 % | HEIGHT: 65 IN | TEMPERATURE: 98.2 F | BODY MASS INDEX: 23.82 KG/M2 | HEART RATE: 76 BPM | RESPIRATION RATE: 15 BRPM | WEIGHT: 143 LBS | SYSTOLIC BLOOD PRESSURE: 112 MMHG | DIASTOLIC BLOOD PRESSURE: 77 MMHG

## 2018-08-13 DIAGNOSIS — K44.9 HIATAL HERNIA: Primary | ICD-10-CM

## 2018-08-13 RX ORDER — BUSPIRONE HYDROCHLORIDE 10 MG/1
10 TABLET ORAL 3 TIMES DAILY
COMMUNITY

## 2018-08-13 NOTE — MR AVS SNAPSHOT
[address] [place] [address], [address] [place] [address] 
[phone] Patient: Darvin Lau MRN: [id number] :1955 Visit Information Date & Time Provider Department Dept. Phone Encounter #  
 2018 10:40 AM Kamila Barnes MD [facility] [id number] Upcoming Health Maintenance Date Due Hepatitis C Screening 1955 Pneumococcal 19-64 Highest Risk (1 of 3 - PCV13) 1972 DTaP/Tdap/Td series (1 - Tdap) 1974 PAP AKA CERVICAL CYTOLOGY 1974 BREAST CANCER SCRN MAMMOGRAM 2003 FOBT Q 1 YEAR AGE 50-75 2003 ZOSTER VACCINE AGE 60> 10/18/2013 Influenza Age 5 to Adult 2018 Allergies as of 2018  Review Complete On: 2018 By: Mauricio Tsai NP Severity Noted Reaction Type Reactions Augmentin [Amoxicillin-pot Clavulanate]  2014    Hives Current Immunizations  Never Reviewed No immunizations on file. Not reviewed this visit Vitals BP Pulse Temp Resp Height(growth percentile) Weight(growth percentile) 112/77 (BP 1 Location: Left arm, BP Patient Position: Sitting) 76 98.2 °F (36.8 °C) (Oral) 15 5' 5\" (1.651 m) 143 lb (64.9 kg) SpO2 BMI OB Status Smoking Status 97% 23.8 kg/m2 Hysterectomy Never Smoker Vitals History BMI and BSA Data Body Mass Index Body Surface Area  
 23.8 kg/m 2 1.73 m 2 Preferred Pharmacy Pharmacy Name Phone [phone] [address] [phone] Your Updated Medication List  
  
   
This list is accurate as of 18 11:31 AM.  Always use your most recent med list.  
  
  
  
  
 albuterol-ipratropium 2.5 mg-0.5 mg/3 ml Nebu Commonly known as:  DUO-NEB  
3 mL by Nebulization route every four (4) hours as needed.  ALLEGRA-D 12 HOUR PO  
 Take 1 Tab by mouth daily as needed (congestion). ALPRAZolam 0.25 mg tablet Commonly known as:  Pixie Oz Take 1 Tab by mouth daily as needed for Anxiety. Indications: anxiety  
  
 benzonatate 200 mg capsule Commonly known as:  TESSALON Every 8 hours for 7 days then every 8 hours as needed  
  
 busPIRone 10 mg tablet Commonly known as:  BUSPAR Take 10 mg by mouth three (3) times daily. fluticasone-vilanterol 100-25 mcg/dose inhaler Commonly known as:  BREO ELLIPTA Take 1 Puff by inhalation daily. guaiFENesin  mg ER tablet Commonly known as:  Gustavo & Gustavo Every 12 hours for 7 days then every 12 hours as needed for congestion  
  
 montelukast 10 mg tablet Commonly known as:  SINGULAIR Take 1 Tab by mouth nightly. Nebulizer & Compressor machine To use nebs as directed  
  
 omeprazole 20 mg capsule Commonly known as:  PriLOSEC Take 1 Cap by mouth Before breakfast and dinner. predniSONE 10 mg tablet Commonly known as:  DELTASONE  
5 tabs daily for 2 days 4 tabs daily for 2 days  3 tabs daily for 2 days  2 tabs daily for 2 days  1 tab   daily for 2 days PROAIR HFA 90 mcg/actuation inhaler Generic drug:  albuterol Take 2 Puffs by inhalation every six (6) hours as needed for Wheezing. Sleep Aid (Doxylamine) 25 mg tablet Generic drug:  doxylamine succinate Take 50 mg by mouth nightly as needed for Sleep. TYLENOL 325 mg tablet Generic drug:  acetaminophen Take 325 mg by mouth every four (4) hours as needed for Pain. Introducing [facility]! Dear Monserrat Menezes: Thank you for requesting a [employer] account. Our records indicate that you already have an active [employer] account. You can access your account anytime at [email or web address] Did you know that you can access your hospital and ER discharge instructions at any time in [employer]?   You can also review all of your test results from your hospital stay or ER visit. Additional Information If you have questions, please visit the Frequently Asked Questions section of the [employer] website at [email or web address] Remember, [employer] is NOT to be used for urgent needs. For medical emergencies, dial 911. Now available from your iPhone and Android! Please provide this summary of care documentation to your next provider. Your primary care clinician is listed as [address]. If you have any questions after today's visit, please call [phone].

## 2018-08-13 NOTE — PROGRESS NOTES
Chief Complaint   Patient presents with    Hiatal Hernia     referred by [facility]     1. Have you been to the ER, urgent care clinic since your last visit? Hospitalized since your last visit? No    2. Have you seen or consulted any other health care providers outside of the [facility] since your last visit? Include any pap smears or colon screening. No     No advanced directive on file.

## 2018-08-14 NOTE — PROGRESS NOTES
To: David Jacques MD    CC: Matias Leal MD (thanks)    From: Cali Robles MD    Crystal Case was referred by hospitalist Dr. Allen Davis at [facility]. Encounter Date: 8/13/2018  History and Physical    Assessment:   Hiatal hernia with GERD, asthma. Possible silent aspiration with reactive airway disease. Recent bronchitis. Body mass index is 23.8 kg/(m^2). Plan/Recommendations/Medical Decision Making:   Her pulmonary issues are acute on chronic and need to be addressed. I spoke with Dr. Jean Mancini who kindly agreed to see her. She would like to hold off on the additional GI work-up until she turn 70 in December. Knows to call for acute worsening in the meantime. She will need UGI, GI referral for possible pH testing, EGD, +/-manometry depending on findings. HPI:   Patient is a 57 y.o. female who is seen in consultation at the request of Dr. Allen Davis hiatal hernia. She was admitted tot [facility] twice in July. The first visit was after a syncopal episode. Had never happened before. She was found to likely be dehydrated and was discharged. Later in the month she was readmitted for bronchitis. CTA the second admission showed \"mild to moderate hiatal hernia. \"  She says she has had asthma for 20 years. Not as a kid. She does not have a pulmonologist but is scheduled to see Dr. Sahra Garnett Sept 19. She says she takes pepcid every night. Often eats late. Sleeps on a few pillows. Does not recall PND. Hoarseness comes and goes. Attributes it to allergies. Past Medical History:   Diagnosis Date    Asthma     Skin cancer     Sun-damaged skin     Tanning bed exposure       Past Surgical History:   Procedure Laterality Date    HX HYSTERECTOMY       Social History   Substance Use Topics    Smoking status: Never Smoker    Smokeless tobacco: Never Used    Alcohol use Yes      Comment: socially      History reviewed. No pertinent family history.     Current Outpatient Prescriptions Medication Sig    busPIRone (BUSPAR) 10 mg tablet Take 10 mg by mouth three (3) times daily.  montelukast (SINGULAIR) 10 mg tablet Take 1 Tab by mouth nightly.  fluticasone-vilanterol (BREO ELLIPTA) 100-25 mcg/dose inhaler Take 1 Puff by inhalation daily.  Nebulizer & Compressor machine To use nebs as directed    guaiFENesin ER (MUCINEX) 600 mg ER tablet Every 12 hours for 7 days then every 12 hours as needed for congestion    omeprazole (PRILOSEC) 20 mg capsule Take 1 Cap by mouth Before breakfast and dinner.  albuterol-ipratropium (DUO-NEB) 2.5 mg-0.5 mg/3 ml nebu 3 mL by Nebulization route every four (4) hours as needed.  albuterol (PROAIR HFA) 90 mcg/actuation inhaler Take 2 Puffs by inhalation every six (6) hours as needed for Wheezing.  doxylamine succinate (SLEEP AID, DOXYLAMINE,) 25 mg tablet Take 50 mg by mouth nightly as needed for Sleep.  acetaminophen (TYLENOL) 325 mg tablet Take 325 mg by mouth every four (4) hours as needed for Pain.  ALPRAZolam (XANAX) 0.25 mg tablet Take 1 Tab by mouth daily as needed for Anxiety. Indications: anxiety    benzonatate (TESSALON) 200 mg capsule Every 8 hours for 7 days then every 8 hours as needed    predniSONE (DELTASONE) 10 mg tablet 5 tabs daily for 2 days  4 tabs daily for 2 days   3 tabs daily for 2 days   2 tabs daily for 2 days   1 tab   daily for 2 days    fexofenadine/pseudoephedrine (ALLEGRA-D 12 HOUR PO) Take 1 Tab by mouth daily as needed (congestion). No current facility-administered medications for this visit. Allergies: Allergies   Allergen Reactions    Augmentin [Amoxicillin-Pot Clavulanate] Hives        Review of Systems:  10 systems reviewed. See scanned sheet in \"Media\" section. See HPI for pertinent positives and negatives.       Objective:     Visit Vitals    /77 (BP 1 Location: Left arm, BP Patient Position: Sitting)    Pulse 76    Temp 98.2 °F (36.8 °C) (Oral)    Resp 15    Ht 5' 5\" (1.651 m)    Wt 64.9 kg (143 lb)    SpO2 97%    BMI 23.8 kg/m2     General appearance  Alert, cooperative, no distress, appears stated age   [de-identified]  Anicteric   Neck Supple       Lungs   CTAB   Heart  Regular rate and rhythm. No murmur, rub or gallop   Abdomen   Soft. Thin. Bowel sounds normal. No masses, no organomegaly apparent. NT. Extremities No CCE. Pulses 2+ right radial   Skin Skin color, texture, turgor normal.    Lymph nodes No palpable lymphadenopathy.    Neurologic Without overt sensory or motor deficit       Signed By: Tenny Galeazzi, MD     August 14, 2018

## 2022-02-02 ENCOUNTER — TRANSCRIBE ORDER (OUTPATIENT)
Dept: SCHEDULING | Age: 67
End: 2022-02-02

## 2022-02-02 DIAGNOSIS — Z12.31 ENCOUNTER FOR SCREENING MAMMOGRAM FOR MALIGNANT NEOPLASM OF BREAST: Primary | ICD-10-CM
